# Patient Record
Sex: MALE | Race: WHITE | NOT HISPANIC OR LATINO | URBAN - METROPOLITAN AREA
[De-identification: names, ages, dates, MRNs, and addresses within clinical notes are randomized per-mention and may not be internally consistent; named-entity substitution may affect disease eponyms.]

---

## 2019-07-20 ENCOUNTER — INPATIENT (INPATIENT)
Facility: HOSPITAL | Age: 69
LOS: 2 days | Discharge: HOME | DRG: 923 | End: 2019-07-23
Attending: INTERNAL MEDICINE | Admitting: INTERNAL MEDICINE
Payer: MEDICARE

## 2019-07-20 VITALS
TEMPERATURE: 99 F | DIASTOLIC BLOOD PRESSURE: 89 MMHG | HEIGHT: 68 IN | RESPIRATION RATE: 16 BRPM | OXYGEN SATURATION: 98 % | WEIGHT: 139.99 LBS | SYSTOLIC BLOOD PRESSURE: 125 MMHG | HEART RATE: 116 BPM

## 2019-07-20 DIAGNOSIS — R42 DIZZINESS AND GIDDINESS: ICD-10-CM

## 2019-07-20 DIAGNOSIS — T67.9XXA EFFECT OF HEAT AND LIGHT, UNSPECIFIED, INITIAL ENCOUNTER: ICD-10-CM

## 2019-07-20 DIAGNOSIS — Z29.9 ENCOUNTER FOR PROPHYLACTIC MEASURES, UNSPECIFIED: ICD-10-CM

## 2019-07-20 LAB
ALBUMIN SERPL ELPH-MCNC: 3.5 G/DL — SIGNIFICANT CHANGE UP (ref 3.5–5)
ALP SERPL-CCNC: 78 U/L — SIGNIFICANT CHANGE UP (ref 40–120)
ALT FLD-CCNC: 17 U/L DA — SIGNIFICANT CHANGE UP (ref 10–60)
ANION GAP SERPL CALC-SCNC: 10 MMOL/L — SIGNIFICANT CHANGE UP (ref 5–17)
APPEARANCE UR: CLEAR — SIGNIFICANT CHANGE UP
APTT BLD: 27.6 SEC — SIGNIFICANT CHANGE UP (ref 27.5–36.3)
AST SERPL-CCNC: 15 U/L — SIGNIFICANT CHANGE UP (ref 10–40)
BASOPHILS # BLD AUTO: 0.02 K/UL — SIGNIFICANT CHANGE UP (ref 0–0.2)
BASOPHILS NFR BLD AUTO: 0.2 % — SIGNIFICANT CHANGE UP (ref 0–2)
BILIRUB SERPL-MCNC: 0.4 MG/DL — SIGNIFICANT CHANGE UP (ref 0.2–1.2)
BILIRUB UR-MCNC: NEGATIVE — SIGNIFICANT CHANGE UP
BUN SERPL-MCNC: 15 MG/DL — SIGNIFICANT CHANGE UP (ref 7–18)
CALCIUM SERPL-MCNC: 8.4 MG/DL — SIGNIFICANT CHANGE UP (ref 8.4–10.5)
CHLORIDE SERPL-SCNC: 105 MMOL/L — SIGNIFICANT CHANGE UP (ref 96–108)
CK SERPL-CCNC: 102 U/L — SIGNIFICANT CHANGE UP (ref 35–232)
CO2 SERPL-SCNC: 22 MMOL/L — SIGNIFICANT CHANGE UP (ref 22–31)
COLOR SPEC: YELLOW — SIGNIFICANT CHANGE UP
CREAT SERPL-MCNC: 1.05 MG/DL — SIGNIFICANT CHANGE UP (ref 0.5–1.3)
DIFF PNL FLD: NEGATIVE — SIGNIFICANT CHANGE UP
EOSINOPHIL # BLD AUTO: 0.01 K/UL — SIGNIFICANT CHANGE UP (ref 0–0.5)
EOSINOPHIL NFR BLD AUTO: 0.1 % — SIGNIFICANT CHANGE UP (ref 0–6)
GLUCOSE SERPL-MCNC: 109 MG/DL — HIGH (ref 70–99)
GLUCOSE UR QL: NEGATIVE — SIGNIFICANT CHANGE UP
HCT VFR BLD CALC: 42.4 % — SIGNIFICANT CHANGE UP (ref 39–50)
HGB BLD-MCNC: 14.3 G/DL — SIGNIFICANT CHANGE UP (ref 13–17)
IMM GRANULOCYTES NFR BLD AUTO: 0.2 % — SIGNIFICANT CHANGE UP (ref 0–1.5)
INR BLD: 1 RATIO — SIGNIFICANT CHANGE UP (ref 0.88–1.16)
KETONES UR-MCNC: NEGATIVE — SIGNIFICANT CHANGE UP
LEUKOCYTE ESTERASE UR-ACNC: NEGATIVE — SIGNIFICANT CHANGE UP
LYMPHOCYTES # BLD AUTO: 1.07 K/UL — SIGNIFICANT CHANGE UP (ref 1–3.3)
LYMPHOCYTES # BLD AUTO: 10.9 % — LOW (ref 13–44)
MCHC RBC-ENTMCNC: 31.6 PG — SIGNIFICANT CHANGE UP (ref 27–34)
MCHC RBC-ENTMCNC: 33.7 GM/DL — SIGNIFICANT CHANGE UP (ref 32–36)
MCV RBC AUTO: 93.8 FL — SIGNIFICANT CHANGE UP (ref 80–100)
MONOCYTES # BLD AUTO: 0.31 K/UL — SIGNIFICANT CHANGE UP (ref 0–0.9)
MONOCYTES NFR BLD AUTO: 3.2 % — SIGNIFICANT CHANGE UP (ref 2–14)
NEUTROPHILS # BLD AUTO: 8.35 K/UL — HIGH (ref 1.8–7.4)
NEUTROPHILS NFR BLD AUTO: 85.4 % — HIGH (ref 43–77)
NITRITE UR-MCNC: NEGATIVE — SIGNIFICANT CHANGE UP
NRBC # BLD: 0 /100 WBCS — SIGNIFICANT CHANGE UP (ref 0–0)
PH UR: 6 — SIGNIFICANT CHANGE UP (ref 5–8)
PLATELET # BLD AUTO: 245 K/UL — SIGNIFICANT CHANGE UP (ref 150–400)
POTASSIUM SERPL-MCNC: 3.8 MMOL/L — SIGNIFICANT CHANGE UP (ref 3.5–5.3)
POTASSIUM SERPL-SCNC: 3.8 MMOL/L — SIGNIFICANT CHANGE UP (ref 3.5–5.3)
PROT SERPL-MCNC: 8 G/DL — SIGNIFICANT CHANGE UP (ref 6–8.3)
PROT UR-MCNC: NEGATIVE — SIGNIFICANT CHANGE UP
PROTHROM AB SERPL-ACNC: 11.1 SEC — SIGNIFICANT CHANGE UP (ref 10–12.9)
RBC # BLD: 4.52 M/UL — SIGNIFICANT CHANGE UP (ref 4.2–5.8)
RBC # FLD: 13 % — SIGNIFICANT CHANGE UP (ref 10.3–14.5)
SODIUM SERPL-SCNC: 137 MMOL/L — SIGNIFICANT CHANGE UP (ref 135–145)
SP GR SPEC: 1.01 — SIGNIFICANT CHANGE UP (ref 1.01–1.02)
TROPONIN I SERPL-MCNC: <0.015 NG/ML — SIGNIFICANT CHANGE UP (ref 0–0.04)
UROBILINOGEN FLD QL: NEGATIVE — SIGNIFICANT CHANGE UP
WBC # BLD: 9.78 K/UL — SIGNIFICANT CHANGE UP (ref 3.8–10.5)
WBC # FLD AUTO: 9.78 K/UL — SIGNIFICANT CHANGE UP (ref 3.8–10.5)

## 2019-07-20 PROCEDURE — 99221 1ST HOSP IP/OBS SF/LOW 40: CPT

## 2019-07-20 PROCEDURE — 99285 EMERGENCY DEPT VISIT HI MDM: CPT

## 2019-07-20 PROCEDURE — 70450 CT HEAD/BRAIN W/O DYE: CPT | Mod: 26

## 2019-07-20 RX ORDER — ENOXAPARIN SODIUM 100 MG/ML
40 INJECTION SUBCUTANEOUS DAILY
Refills: 0 | Status: DISCONTINUED | OUTPATIENT
Start: 2019-07-20 | End: 2019-07-23

## 2019-07-20 RX ORDER — ASPIRIN/CALCIUM CARB/MAGNESIUM 324 MG
81 TABLET ORAL DAILY
Refills: 0 | Status: DISCONTINUED | OUTPATIENT
Start: 2019-07-20 | End: 2019-07-20

## 2019-07-20 RX ORDER — MECLIZINE HCL 12.5 MG
25 TABLET ORAL EVERY 8 HOURS
Refills: 0 | Status: DISCONTINUED | OUTPATIENT
Start: 2019-07-20 | End: 2019-07-23

## 2019-07-20 RX ORDER — ACETAMINOPHEN 500 MG
650 TABLET ORAL ONCE
Refills: 0 | Status: COMPLETED | OUTPATIENT
Start: 2019-07-20 | End: 2019-07-20

## 2019-07-20 RX ORDER — SODIUM CHLORIDE 9 MG/ML
1000 INJECTION INTRAMUSCULAR; INTRAVENOUS; SUBCUTANEOUS ONCE
Refills: 0 | Status: COMPLETED | OUTPATIENT
Start: 2019-07-20 | End: 2019-07-20

## 2019-07-20 RX ORDER — OXYCODONE AND ACETAMINOPHEN 5; 325 MG/1; MG/1
1 TABLET ORAL EVERY 6 HOURS
Refills: 0 | Status: DISCONTINUED | OUTPATIENT
Start: 2019-07-20 | End: 2019-07-21

## 2019-07-20 RX ORDER — ATORVASTATIN CALCIUM 80 MG/1
40 TABLET, FILM COATED ORAL AT BEDTIME
Refills: 0 | Status: DISCONTINUED | OUTPATIENT
Start: 2019-07-20 | End: 2019-07-20

## 2019-07-20 RX ORDER — ACETAMINOPHEN 500 MG
650 TABLET ORAL EVERY 6 HOURS
Refills: 0 | Status: DISCONTINUED | OUTPATIENT
Start: 2019-07-20 | End: 2019-07-23

## 2019-07-20 RX ORDER — SODIUM CHLORIDE 9 MG/ML
1000 INJECTION, SOLUTION INTRAVENOUS
Refills: 0 | Status: DISCONTINUED | OUTPATIENT
Start: 2019-07-20 | End: 2019-07-23

## 2019-07-20 RX ORDER — OXYCODONE AND ACETAMINOPHEN 5; 325 MG/1; MG/1
2 TABLET ORAL EVERY 6 HOURS
Refills: 0 | Status: DISCONTINUED | OUTPATIENT
Start: 2019-07-20 | End: 2019-07-21

## 2019-07-20 RX ADMIN — Medication 650 MILLIGRAM(S): at 16:41

## 2019-07-20 RX ADMIN — SODIUM CHLORIDE 1000 MILLILITER(S): 9 INJECTION INTRAMUSCULAR; INTRAVENOUS; SUBCUTANEOUS at 12:07

## 2019-07-20 RX ADMIN — OXYCODONE AND ACETAMINOPHEN 1 TABLET(S): 5; 325 TABLET ORAL at 23:54

## 2019-07-20 RX ADMIN — Medication 650 MILLIGRAM(S): at 19:52

## 2019-07-20 RX ADMIN — SODIUM CHLORIDE 70 MILLILITER(S): 9 INJECTION, SOLUTION INTRAVENOUS at 23:12

## 2019-07-20 NOTE — CHART NOTE - NSCHARTNOTEFT_GEN_A_CORE
Pt is a  69 year old male who came to the ED  for  dizziness and headache . Pt was seen at bedside by this swker. Pt states he lives with his brother in an elevator building and ambulates with a cane.   Pt reports that he does not have air-conditioner in his apartment and he has been having a headache since yesterday due to the heat. Pt is currently admitted. He was provided with an address of a cooling center that is open until 9pm Monday through Sunday, [facility] () . Pt was encouraged to utilize the resource.

## 2019-07-20 NOTE — ED ADULT NURSE REASSESSMENT NOTE - NS ED NURSE REASSESS COMMENT FT1
as per the pt family next to him pt sat him self on the floor states " I don'nt want to go home "
called  pt had concerns going back home pt states " its too hot at home "

## 2019-07-20 NOTE — ED PROVIDER NOTE - PROGRESS NOTE DETAILS
tej  pt states he has no airconditioner and lives alone   he cannot go home as is it over 100 degrees outside  ems apparently stated that the home was "very very hot"   pt unable to go to cooling station   will admit

## 2019-07-20 NOTE — H&P ADULT - NSHPPHYSICALEXAM_GEN_ALL_CORE
· CONSTITUTIONAL: Well appearing, well nourished, awake,confused   · ENMT: Airway patent, Nasal mucosa clear. Mouth with normal mucosa. Throat has no vesicles, no oropharyngeal exudates and uvula is midline.  · EYES: Clear bilaterally, pupils equal, round and reactive to light.  · CARDIAC: Normal rate, regular rhythm.  Heart sounds S1, S2.  No murmurs, rubs or gallops.  · RESPIRATORY: Breath sounds clear and equal bilaterally.  · GASTROINTESTINAL: Abdomen soft, non-tender, no guarding.  · MUSCULOSKELETAL: Spine appears normal, range of motion is not limited, no muscle or joint tenderness  · NEUROLOGICAL: Alert and alert follows commands only , no focal deficits, no motor or sensory deficits.  · SKIN: Skin normal color for race, warm, dry and intact. No evidence of rash.    Vital Signs Last 24 Hrs  T(C): 36.7 (20 Jul 2019 21:17), Max: 37.3 (20 Jul 2019 17:20)  T(F): 98.1 (20 Jul 2019 21:17), Max: 99.1 (20 Jul 2019 17:20)  HR: 94 (20 Jul 2019 21:17) (88 - 116)  BP: 143/73 (20 Jul 2019 21:17) (104/60 - 143/73)  RR: 17 (20 Jul 2019 21:17) (16 - 18)  SpO2: 99% (20 Jul 2019 21:17) (97% - 99%) Vital Signs Last 24 Hrs  T(C): 36.7 (20 Jul 2019 21:17), Max: 37.3 (20 Jul 2019 17:20)  T(F): 98.1 (20 Jul 2019 21:17), Max: 99.1 (20 Jul 2019 17:20)  HR: 94 (20 Jul 2019 21:17) (88 - 116)  BP: 143/73 (20 Jul 2019 21:17) (104/60 - 143/73)  RR: 17 (20 Jul 2019 21:17) (16 - 18)  SpO2: 99% (20 Jul 2019 21:17) (97% - 99%)      · CONSTITUTIONAL: Well appearing, well nourished, awake, confused   · ENMT: Airway patent, Nasal mucosa clear. Mouth with normal mucosa. Throat has no vesicles, no oropharyngeal exudates and uvula is midline.  · EYES: Clear bilaterally, pupils equal, round and reactive to light.  · CARDIAC: Normal rate, regular rhythm.  Heart sounds S1, S2.  No murmurs, rubs or gallops.  · RESPIRATORY: Breath sounds clear and equal bilaterally.  · GASTROINTESTINAL: Abdomen soft, non-tender, no guarding.  · MUSCULOSKELETAL: Spine appears normal, range of motion is not limited, no muscle or joint tenderness  · NEUROLOGICAL: Alert and alert follows commands only , no focal deficits, no motor or sensory deficits.  · SKIN: Skin normal color for race, warm, dry and intact. No evidence of rash.

## 2019-07-20 NOTE — ED PROVIDER NOTE - CARE PLAN
Principal Discharge DX:	Dizziness  Secondary Diagnosis:	Acute nonintractable headache, unspecified headache type  Secondary Diagnosis:	Heat exhaustion, initial encounter Principal Discharge DX:	Heat effects of, initial encounter  Secondary Diagnosis:	Acute nonintractable headache, unspecified headache type  Secondary Diagnosis:	Heat exhaustion, initial encounter

## 2019-07-20 NOTE — H&P ADULT - PROBLEM SELECTOR PLAN 2
- p/w headache , dizziness x 1 day   - upon my encounter : pt was confused  - Will give IV fluids and hydration  - f/u Orthostatics    - f/u Social work consult (pt does not have A/C at home)

## 2019-07-20 NOTE — H&P ADULT - ASSESSMENT
68 y/o M patient presents to the ED w/ dizziness and headache that began last night w/ suspicion of a heat-related illness as per patient. Patient notes he was short of breath before but now is not.  pt mentioned he does not have A/C at home with hot weather these days. Patient denies LOC or passing out. Patient denies vomiting, fever, diarrhea, or urinary symptoms. Patient denies smoking, EtOH use, or any other drug use. NKDA.  In ED :     EKG and labs are unremarkable.     Pt is admitted for dizziness 70 y/o M poor historian patient presents to the ED w/ dizziness and headache that began last night w/ suspicion of a heat-related illness as per patient. Patient notes he was short of breath before but now is not.  pt mentioned he does not have A/C at home with hot weather these days. Patient denies LOC or passing out. Patient denies vomiting, fever, diarrhea, or urinary symptoms. Patient denies smoking, EtOH use, or any other drug use. NKDA.  In ED :     EKG and labs are unremarkable.   Pt is admitted for dizziness 68 y/o M poor historian patient presents to the ED w/ dizziness and headache that began last night w/ suspicion of a heat-related illness as per patient. Patient notes he was short of breath before but now is not.  pt mentioned he does not have A/C at home with hot weather these days. Patient denies LOC or passing out. Patient denies vomiting, fever, diarrhea, or urinary symptoms. Patient denies smoking, EtOH use, or any other drug use. NKDA.  In ED:   EKG and labs are unremarkable.   Pt is admitted for dizziness

## 2019-07-20 NOTE — H&P ADULT - PROBLEM SELECTOR PLAN 3
IMPROVE VTE Individual Risk Assessment    RISK                                                                Points    [  ] Previous VTE                                                  3    [  ] Thrombophilia                                               2    [  ] Lower limb paralysis                                      2        (unable to hold up >15 seconds)    [  ] Current Cancer                                              2         (within 6 months)  [X] Immobilization > 24 hrs                                1  [  ] ICU/CCU stay > 24 hours                              1  [X] Age > 60                                                      1    IMPROVE VTE Score _____2____  c/w Lovenox 40 mg qd  no need for GI ppx.

## 2019-07-20 NOTE — CONSULT NOTE ADULT - SUBJECTIVE AND OBJECTIVE BOX
69 [facility] who experienced dizziness (not room-spinning sensation) and frontal headache while at home. Dizziness is resolved, headache is persisting. No loss of consciousness, photophobia, phonophobia, nausea, or emesis.  He has not had previous similar symptoms.    Neuro exam: Oriented x 2 (not to time), No focal neuro deficits in cranial nerves, motor, sensory, coordination, or reflexes. Gait/Romberg deferred per patient request due to headache. NIHSS 0, MRS 1.    Likely diagnosis: Tension headache and dizziness in setting of heat (unlikely stroke or seizure)    Recs:  - Encourage plentiful fluid hydration (2L water daily)  - Provide conservative pain management for headache (acetaminophen or NSAIDs); avoid opiates to prevent worsening of disorientation  - Meclizine PRN dizziness/vertigo  - No need for aspirin or atorvastatin given no evidence of stroke on neurological exam and CT Head, unless these are indicated for non-neurological reasons.  - May consider carotid Doppler to evaluate blood flow to head.  - If carotid Doppler shows stenosis, may consider CTA Head for further evaluation of intracranial vasculature  - Monitor vital signs; if irregular, consider cardiovascular workup (Echo, Telemetry)        NOTE TO BE COMPLETED - PLEASE REFER TO ABOVE ONLY AND IGNORE INFORMATION BELOW    Neurology Consult    Patient is a 69y old  Male who presents with a chief complaint of Dizziness (2019 17:49)      HPI:  68 y/o M poor historian patient presents to the ED w/ dizziness and headache that began last night w/ suspicion of a heat-related illness as per patient. Patient notes he was short of breath before but now is not.  Patient mentioned he does not have A/C at home with hot weather these days. Patient denies LOC or passing out. Patient denies vomiting, fever, diarrhea, or urinary symptoms. Patient denies smoking, EtOH use, or any other drug use. NKDA.  In ED:   EKG and labs are unremarkable. (2019 17:49)      PAST MEDICAL & SURGICAL HISTORY:  No pertinent past medical history  No significant past surgical history      FAMILY HISTORY:  No pertinent family history in first degree relatives      Social History: (-) x 3    Allergies    No Known Allergies    Intolerances        MEDICATIONS  (STANDING):  aspirin enteric coated 81 milliGRAM(s) Oral daily  atorvastatin 40 milliGRAM(s) Oral at bedtime  dextrose 5% + sodium chloride 0.9%. 1000 milliLiter(s) (70 mL/Hr) IV Continuous <Continuous>  enoxaparin Injectable 40 milliGRAM(s) SubCutaneous daily    MEDICATIONS  (PRN):  meclizine 25 milliGRAM(s) Oral every 8 hours PRN Dizziness      Review of systems:    Constitutional: No fever, weight loss or fatigue    Eyes: No eye pain or discharge  ENMT:  No difficulty hearing; No sinus or throat pain  Neck: No pain or stiffness  Respiratory: No cough, wheezing, chills or hemoptysis  Cardiovascular: No chest pain, palpitations, shortness of breath, dyspnea on exertion  Gastrointestinal: No abdominal pain, nausea, vomiting or hematemesis; No diarrhea or constipation.   Genitourinary: No dysuria, frequency, hematuria or incontinence  Neurological: As per HPI  Skin: No rashes or lesions   Endocrine: No heat or cold intolerance; No hair loss  Musculoskeletal: No joint pain or swelling  Psychiatric: No depression, anxiety, mood swings  Heme/Lymph: No easy bruising or bleeding gums    Vital Signs Last 24 Hrs  T(C): 36.7 (2019 21:17), Max: 37.3 (2019 17:20)  T(F): 98.1 (2019 21:17), Max: 99.1 (2019 17:20)  HR: 94 (2019 21:17) (88 - 116)  BP: 143/73 (2019 21:17) (104/60 - 143/73)  BP(mean): --  RR: 17 (2019 21:17) (16 - 18)  SpO2: 99% (2019 21:17) (97% - 99%)    Neurologic Examination:  General:  Appearance is consistent with chronologic age.  No abnormal facies.   General: The patient is oriented to person, place, time and date.  Recent and remote memory intact.  Fund of knowledge is intact and normal.  Language with normal repetition, comprehension and naming.  Nondysarthric.    Cranial nerves: intact VA, VFF.  EOMI w/o nystagmus, skew or reported double vision.  PERRL.  No ptosis/weakness of eyelid closure.  Facial sensation is normal with normal bite.  No facial asymmetry.  Hearing grossly intact b/l.  Palate elevates midline.  Tongue midline.  Motor examination:   Normal tone, bulk and range of motion.  No tenderness, twitching, tremors or involuntary movements.  Formal Muscle Strength Testing: (MRC grade R/L) 5/5 UE; 5/5 LE.  No observable drift.  Reflexes:   2+ b/l pectoralis, biceps, triceps, brachioradialis, patella and Achilles.  Plantar response downgoing b/l.  Jaw jerk, Elissa, clonus absent.  Sensory examination:   Intact to light touch and pinprick, pain, temperature and proprioception and vibration in all extremities.  Cerebellum:   FTN/HKS intact with normal ZION in all limbs.  No dysmetria or dysdiadokinesia.  Gait narrow based and normal.    Labs:   CBC Full  -  ( 2019 12:41 )  WBC Count : 9.78 K/uL  RBC Count : 4.52 M/uL  Hemoglobin : 14.3 g/dL  Hematocrit : 42.4 %  Platelet Count - Automated : 245 K/uL  Mean Cell Volume : 93.8 fl  Mean Cell Hemoglobin : 31.6 pg  Mean Cell Hemoglobin Concentration : 33.7 gm/dL  Auto Neutrophil # : 8.35 K/uL  Auto Lymphocyte # : 1.07 K/uL  Auto Monocyte # : 0.31 K/uL  Auto Eosinophil # : 0.01 K/uL  Auto Basophil # : 0.02 K/uL  Auto Neutrophil % : 85.4 %  Auto Lymphocyte % : 10.9 %  Auto Monocyte % : 3.2 %  Auto Eosinophil % : 0.1 %  Auto Basophil % : 0.2 %    07-20    137  |  105  |  15  ----------------------------<  109<H>  3.8   |  22  |  1.05    Ca    8.4      2019 12:41    TPro  8.0  /  Alb  3.5  /  TBili  0.4  /  DBili  x   /  AST  15  /  ALT  17  /  AlkPhos  78  07-20    LIVER FUNCTIONS - ( 2019 12:41 )  Alb: 3.5 g/dL / Pro: 8.0 g/dL / ALK PHOS: 78 U/L / ALT: 17 U/L DA / AST: 15 U/L / GGT: x           PT/INR - ( 2019 14:54 )   PT: 11.1 sec;   INR: 1.00 ratio         PTT - ( 2019 14:54 )  PTT:27.6 sec  Urinalysis Basic - ( 2019 14:54 )    Color: Yellow / Appearance: Clear / S.010 / pH: x  Gluc: x / Ketone: Negative  / Bili: Negative / Urobili: Negative   Blood: x / Protein: Negative / Nitrite: Negative   Leuk Esterase: Negative / RBC: x / WBC x   Sq Epi: x / Non Sq Epi: x / Bacteria: x          Neuroimaging:    CT Head (19):  - No acute intracranial abnormality  - Mild diffuse atrophy and mild ventriculomegaly    EXAM:    CT Head Without Contrast     EXAM DATE/TIME:    2019 8:27 PM     CLINICAL HISTORY:    69 years old, male; Altered mental status/memory loss     TECHNIQUE:    Imaging protocol: Computed tomography images of the head without   contrast.   Coronal and sagittal reformatted images were created and reviewed.     COMPARISON:    No relevant prior studies available.     FINDINGS:    Brain: Normal. No hemorrhage. Unremarkable white matter. No mass effect.    Ventricles: There is mild, diffuse involutional change with mild   associated   ventriculomegaly.    Bones/joints: Unremarkable. No acute fracture.    Sinuses: Visualized sinuses are unremarkable. No fluid levels.    Mastoid air cells: Visualized mastoid air cells are well aerated. No   mastoid   effusion.    Soft tissues: Unremarkable.     IMPRESSION:   There is mild, diffuse involutional change with mild associated   ventriculomegaly. No acute or focal brain abnormality is seen.          ******PRELIMINARY REPORT******    ******PRELIMINARY REPORT******              CL LOPEZ M.D.;[facility] RADIOLOGIST               (19 @ 20:41)    CT Angiography/Perfusion:  MRI Brain NC:  MRA Head/Neck:  EEG:    Assessment:  This is a 69y Male with h/o     Plan:   -   19 @ 23:08          Please contact the Neurology consult service with any questions.    Jerardo Tyler MD  Neurology Attending  [facility] Neurology Consult    Patient is a 69y old  Male who presents with a chief complaint of Dizziness (2019 17:49)    HPI:  68 y/o M poor historian patient presents to the ED w/ dizziness and headache that began last night w/ suspicion of a heat-related illness as per patient. Patient notes he was short of breath before but now is not.  Patient mentioned he does not have A/C at home with hot weather these days. Patient denies LOC or passing out. Patient denies vomiting, fever, diarrhea, or urinary symptoms. Patient denies smoking, EtOH use, or any other drug use. NKDA.  In ED: EKG and labs are unremarkable. (2019 17:49)    Neuro HPI:  69 LHM who experienced dizziness (not room-spinning sensation) and frontal headache while at home. Dizziness is resolved, headache is persisting. No loss of consciousness, photophobia, phonophobia, nausea, or emesis. He has not had previous similar symptoms.    PAST MEDICAL & SURGICAL HISTORY: None reported    FAMILY HISTORY: None reported    Social History: No toxic habits reported    Allergies  No Known Allergies    MEDICATIONS  (STANDING):  aspirin enteric coated 81 milliGRAM(s) Oral daily  atorvastatin 40 milliGRAM(s) Oral at bedtime  dextrose 5% + sodium chloride 0.9%. 1000 milliLiter(s) (70 mL/Hr) IV Continuous <Continuous>  enoxaparin Injectable 40 milliGRAM(s) SubCutaneous daily    MEDICATIONS  (PRN):  meclizine 25 milliGRAM(s) Oral every 8 hours PRN Dizziness    Review of systems:    Constitutional: No fever, weight loss or fatigue    Eyes: No eye pain or discharge  ENMT:  No difficulty hearing; No sinus or throat pain  Neck: No pain or stiffness  Respiratory: No cough, wheezing, chills or hemoptysis  Cardiovascular: No chest pain, palpitations, shortness of breath  Gastrointestinal: No abdominal pain, nausea, vomiting or hematemesis; No diarrhea or constipation.   Genitourinary: No dysuria, frequency, hematuria or incontinence  Neurological: As per HPI  Skin: No rashes or lesions   Endocrine: No heat or cold intolerance  Musculoskeletal: No joint pain or swelling  Psychiatric: No depression, anxiety  Heme/Lymph: No easy bruising or bleeding      Objective:    Vital Signs Last 24 Hrs  T(C): 36.7 (2019 21:17), Max: 37.3 (2019 17:20)  T(F): 98.1 (2019 21:17), Max: 99.1 (2019 17:20)  HR: 94 (2019 21:17) (88 - 116)  BP: 143/73 (2019 21:17) (104/60 - 143/73)  RR: 17 (2019 21:17) (16 - 18)  SpO2: 99% (2019 21:17) (97% - 99%)    General Exam:  General: No acute distress  Respiratory: CTAB/l.  No crackles, rhonchi, or wheezes.  Cardiovascular: RRR, No murmurs, Full b/l radial and pedal pulses    Neurological Exam:  General / Mental Status: Oriented to person and place, but not to time.  No dysarthria or aphasia present.  Naming and repetition intact.  Cranial Nerves: PERRLA, EOMI x 2, VFF x 4, No nystagmus or diplopia, Optic discs normal b/l.  B/l V1-V3 equal to light touch and pinprick.  Symmetric facial movement and palate elevation.  B/l hearing equal to finger rub.  5/5 strength with b/l sternocleidomastoid and trapezius.  Midline tongue protrusion with no atrophy or fasciculations.  Motor: Normal bulk and tone in all four extremities.  5/5 strength throughout all four extremities.  No downward drift, rigidity, spasticity, or tremors in any of the four extremities.  Sensation: Intact to light touch and pinprick in all four extremities.  Coordination: No dysmetria with b/l finger-to-nose and heel raise tests.  Normal rapid alternating movements b/l.  Reflexes: 2+ and symmetric at b/l biceps, triceps, brachioradialis, patellae, and ankles.  Toes flexor b/l.  Gait and Romberg testing deferred per patient request due to headache.     NIHSS Score:    LOC - 0  LOC Questions - 0  LOC Commands - 0  Gaze Preference - 0  Visual Fields - 0  Facial Palsy - 0  Motor Arm Left - 0  Motor Arm Right - 0  Motor Leg Left - 0  Motor Leg Right - 0  Limb Ataxia - 0  Sensory - 0  Language - 0  Speech - 0  Extinction - 0    NIHSS Score Total: 0    Modified Richard Scale: 1      Labs:   CBC Full  -  ( 2019 12:41 )  WBC Count : 9.78 K/uL  RBC Count : 4.52 M/uL  Hemoglobin : 14.3 g/dL  Hematocrit : 42.4 %  Platelet Count - Automated : 245 K/uL  Mean Cell Volume : 93.8 fl  Mean Cell Hemoglobin : 31.6 pg  Mean Cell Hemoglobin Concentration : 33.7 gm/dL  Auto Neutrophil # : 8.35 K/uL  Auto Lymphocyte # : 1.07 K/uL  Auto Monocyte # : 0.31 K/uL  Auto Eosinophil # : 0.01 K/uL  Auto Basophil # : 0.02 K/uL  Auto Neutrophil % : 85.4 %  Auto Lymphocyte % : 10.9 %  Auto Monocyte % : 3.2 %  Auto Eosinophil % : 0.1 %  Auto Basophil % : 0.2 %        137  |  105  |  15  ----------------------------<  109<H>  3.8   |  22  |  1.05    Ca    8.4      2019 12:41    TPro  8.0  /  Alb  3.5  /  TBili  0.4  /  DBili  x   /  AST  15  /  ALT  17  /  AlkPhos  78  07-20    PT/INR - ( 2019 14:54 )   PT: 11.1 sec;   INR: 1.00 ratio    PTT - ( 2019 14:54 )  PTT:27.6 sec    Urinalysis Basic - ( 2019 14:54 )  Color: Yellow / Appearance: Clear / S.010 / pH: x  Gluc: x / Ketone: Negative  / Bili: Negative / Urobili: Negative   Blood: x / Protein: Negative / Nitrite: Negative   Leuk Esterase: Negative / RBC: x / WBC x   Sq Epi: x / Non Sq Epi: x / Bacteria: x      Neuroimaging:    CT Head (19):  - No acute intracranial abnormality  - Mild diffuse atrophy and mild ventriculomegaly      Assessment:  69 LHM with likely tension headache and dizziness in setting of heat, unlikely to be stroke given normal neurological exam, and unlikely to be seizure given no loss of consciousness      Recommendations:    - Encourage plentiful fluid hydration (2L water daily).    - Provide conservative pain management for headache (acetaminophen or NSAIDs); avoid opiates to prevent worsening of disorientation.    - Meclizine PRN dizziness/vertigo.    - No need for aspirin or atorvastatin given no evidence of stroke on neurological exam and CT Head, unless these are indicated for non-neurological reasons.    - May consider carotid Doppler to evaluate blood flow to head.    - If carotid Doppler shows stenosis, may consider CTA Head for further evaluation of intracranial vasculature.    - Monitor vital signs; if irregular, consider cardiovascular workup (Echo, Telemetry).      Please contact the Neurology consult service with any questions.    Jerardo Tyler MD  Neurology Attending  [facility]

## 2019-07-20 NOTE — ED PROVIDER NOTE - OBJECTIVE STATEMENT
68 y/o M patient presents to the ED w/ dizziness and headache that began last night. Patient notes he was short of breath before but now is not. Patient denies LOC or passing out. Patient denies vomiting, fever, diarrhea, or urinary symptoms. Patient denies smoking, EtOH use, or any other drug use. NKDA.

## 2019-07-20 NOTE — H&P ADULT - PROBLEM SELECTOR PLAN 1
- p/w headache , dizziness x 1 day   - no nystagmus  - Tashi ames pike test could not be assessed  - EKG: NSR   - upon my encounter : pt was confused  - Will give IV fluids and hydration  - NPO except Meds  - advance diet as tolerated   - started on aspirin and statin   - c/w meclizine 25 q 8 for now PRn  - f/u CT head to r/o posterior stroke circulation   - f/u Orthostatics    - f/u carotid doppler   - f/u Echo  - f/u hba1c and lipid panel   - f/u PT Eval  - f/u Social work consult (pt does not have A/C at home) - p/w headache , dizziness x 1 day   - no nystagmus  - Tashi ames pike test could not be assessed  - EKG: NSR   - upon my encounter : pt was confused  - Will give IV fluids and hydration  - NPO except Meds  - advance diet as tolerated  - Fall precautions   - started on aspirin and statin   - c/w meclizine 25 q 8 for now PRn  - f/u CT head to r/o posterior stroke circulation   - f/u Orthostatics    - f/u carotid doppler   - f/u Echo  - f/u hba1c and lipid panel   - f/u PT Eval  - f/u Social work consult (pt does not have A/C at home) - p/w headache , dizziness x 1 day   - no nystagmus  - Tashi ames pike test could not be assessed  - EKG: NSR   - upon my encounter : pt was confused  - Will give IV fluids and hydration  - NPO except Meds  - advance diet as tolerated  - Fall precautions   - started on aspirin and statin   - c/w meclizine 25 q 8 for now PRn  - f/u CT head to r/o posterior stroke circulation   - f/u Orthostatics    - f/u carotid doppler   - f/u Echo  - f/u hba1c and lipid panel   - f/u PT Eval  - f/u Social work consult (pt does not have A/C at home)  ** Neurology consulted Dr. Tyler - p/w headache , dizziness x 1 day   - no nystagmus  - Tashi ames pike test could not be assessed  - EKG: NSR   - upon my encounter : pt was confused  - Will give IV fluids and hydration  - NPO except Meds  - Fall precautions   - C/ Pain Meds Tylenol /Oxycodone PRN  - c/w meclizine 25 q 8 for now PRn  - f/u CT head to r/o posterior stroke circulation   - f/u Orthostatics    - f/u carotid doppler   - f/u Echo  - f/u hba1c and lipid panel   - f/u PT Eval  - S/S  - f/u Social work consult (pt does not have A/C at home)  ** Neurology consulted Dr. Tyler

## 2019-07-20 NOTE — H&P ADULT - ATTENDING COMMENTS
Patient seen and examined in ED. Patient's history, vitals, labs, imaging studies reviewed. Discussed with above resident, agree with note with edits, and educated on the diagnosis and management of above medical conditions. Plan of care discussed with patient, and agrees, all questions answered.   Emily Suarez MD Patient seen and examined in ED. Patient's history, vitals, labs, imaging studies reviewed. Discussed with above resident, agree with note with edits, and educated on the diagnosis and management of above medical conditions. Plan of care discussed with patient, and agrees, all questions answered.   Emily Suarez MD.

## 2019-07-20 NOTE — ED PROVIDER NOTE - CLINICAL SUMMARY MEDICAL DECISION MAKING FREE TEXT BOX
70 y/o M patient presents to the ED w/ suspicion of a heat-related illness as per patient. EKG and labs are unremarkable. Will give IV fluids and hydration. Patient is safe for discharge with return precautions.

## 2019-07-21 DIAGNOSIS — E53.8 DEFICIENCY OF OTHER SPECIFIED B GROUP VITAMINS: ICD-10-CM

## 2019-07-21 LAB
ALBUMIN SERPL ELPH-MCNC: 3 G/DL — LOW (ref 3.5–5)
ALP SERPL-CCNC: 66 U/L — SIGNIFICANT CHANGE UP (ref 40–120)
ALT FLD-CCNC: 14 U/L DA — SIGNIFICANT CHANGE UP (ref 10–60)
ANION GAP SERPL CALC-SCNC: 6 MMOL/L — SIGNIFICANT CHANGE UP (ref 5–17)
AST SERPL-CCNC: 19 U/L — SIGNIFICANT CHANGE UP (ref 10–40)
BASOPHILS # BLD AUTO: 0.01 K/UL — SIGNIFICANT CHANGE UP (ref 0–0.2)
BASOPHILS NFR BLD AUTO: 0.1 % — SIGNIFICANT CHANGE UP (ref 0–2)
BILIRUB SERPL-MCNC: 0.4 MG/DL — SIGNIFICANT CHANGE UP (ref 0.2–1.2)
BUN SERPL-MCNC: 8 MG/DL — SIGNIFICANT CHANGE UP (ref 7–18)
CALCIUM SERPL-MCNC: 8.1 MG/DL — LOW (ref 8.4–10.5)
CHLORIDE SERPL-SCNC: 111 MMOL/L — HIGH (ref 96–108)
CHOLEST SERPL-MCNC: 209 MG/DL — HIGH (ref 10–199)
CO2 SERPL-SCNC: 24 MMOL/L — SIGNIFICANT CHANGE UP (ref 22–31)
CREAT SERPL-MCNC: 0.97 MG/DL — SIGNIFICANT CHANGE UP (ref 0.5–1.3)
EOSINOPHIL # BLD AUTO: 0.11 K/UL — SIGNIFICANT CHANGE UP (ref 0–0.5)
EOSINOPHIL NFR BLD AUTO: 1 % — SIGNIFICANT CHANGE UP (ref 0–6)
FOLATE SERPL-MCNC: >20 NG/ML — SIGNIFICANT CHANGE UP
GLUCOSE BLDC GLUCOMTR-MCNC: 105 MG/DL — HIGH (ref 70–99)
GLUCOSE BLDC GLUCOMTR-MCNC: 105 MG/DL — HIGH (ref 70–99)
GLUCOSE BLDC GLUCOMTR-MCNC: 147 MG/DL — HIGH (ref 70–99)
GLUCOSE SERPL-MCNC: 106 MG/DL — HIGH (ref 70–99)
HBA1C BLD-MCNC: 5.6 % — SIGNIFICANT CHANGE UP (ref 4–5.6)
HCT VFR BLD CALC: 40.2 % — SIGNIFICANT CHANGE UP (ref 39–50)
HCV AB S/CO SERPL IA: 0.05 S/CO — SIGNIFICANT CHANGE UP (ref 0–0.99)
HCV AB SERPL-IMP: SIGNIFICANT CHANGE UP
HDLC SERPL-MCNC: 51 MG/DL — SIGNIFICANT CHANGE UP
HGB BLD-MCNC: 13.3 G/DL — SIGNIFICANT CHANGE UP (ref 13–17)
IMM GRANULOCYTES NFR BLD AUTO: 0.4 % — SIGNIFICANT CHANGE UP (ref 0–1.5)
LIPID PNL WITH DIRECT LDL SERPL: 133 MG/DL — SIGNIFICANT CHANGE UP
LYMPHOCYTES # BLD AUTO: 19.5 % — SIGNIFICANT CHANGE UP (ref 13–44)
LYMPHOCYTES # BLD AUTO: 2.05 K/UL — SIGNIFICANT CHANGE UP (ref 1–3.3)
MAGNESIUM SERPL-MCNC: 2.2 MG/DL — SIGNIFICANT CHANGE UP (ref 1.6–2.6)
MCHC RBC-ENTMCNC: 31.4 PG — SIGNIFICANT CHANGE UP (ref 27–34)
MCHC RBC-ENTMCNC: 33.1 GM/DL — SIGNIFICANT CHANGE UP (ref 32–36)
MCV RBC AUTO: 94.8 FL — SIGNIFICANT CHANGE UP (ref 80–100)
MONOCYTES # BLD AUTO: 0.58 K/UL — SIGNIFICANT CHANGE UP (ref 0–0.9)
MONOCYTES NFR BLD AUTO: 5.5 % — SIGNIFICANT CHANGE UP (ref 2–14)
NEUTROPHILS # BLD AUTO: 7.7 K/UL — HIGH (ref 1.8–7.4)
NEUTROPHILS NFR BLD AUTO: 73.5 % — SIGNIFICANT CHANGE UP (ref 43–77)
NRBC # BLD: 0 /100 WBCS — SIGNIFICANT CHANGE UP (ref 0–0)
PHOSPHATE SERPL-MCNC: 2.8 MG/DL — SIGNIFICANT CHANGE UP (ref 2.5–4.5)
PLATELET # BLD AUTO: 237 K/UL — SIGNIFICANT CHANGE UP (ref 150–400)
POTASSIUM SERPL-MCNC: 3.5 MMOL/L — SIGNIFICANT CHANGE UP (ref 3.5–5.3)
POTASSIUM SERPL-SCNC: 3.5 MMOL/L — SIGNIFICANT CHANGE UP (ref 3.5–5.3)
PROT SERPL-MCNC: 6.8 G/DL — SIGNIFICANT CHANGE UP (ref 6–8.3)
RBC # BLD: 4.24 M/UL — SIGNIFICANT CHANGE UP (ref 4.2–5.8)
RBC # FLD: 13.2 % — SIGNIFICANT CHANGE UP (ref 10.3–14.5)
SODIUM SERPL-SCNC: 141 MMOL/L — SIGNIFICANT CHANGE UP (ref 135–145)
TOTAL CHOLESTEROL/HDL RATIO MEASUREMENT: 4.1 RATIO — SIGNIFICANT CHANGE UP (ref 3.4–9.6)
TRIGL SERPL-MCNC: 127 MG/DL — SIGNIFICANT CHANGE UP (ref 10–149)
TSH SERPL-MCNC: 1.61 UU/ML — SIGNIFICANT CHANGE UP (ref 0.34–4.82)
VIT B12 SERPL-MCNC: 405 PG/ML — SIGNIFICANT CHANGE UP (ref 232–1245)
WBC # BLD: 10.49 K/UL — SIGNIFICANT CHANGE UP (ref 3.8–10.5)
WBC # FLD AUTO: 10.49 K/UL — SIGNIFICANT CHANGE UP (ref 3.8–10.5)

## 2019-07-21 PROCEDURE — 99231 SBSQ HOSP IP/OBS SF/LOW 25: CPT

## 2019-07-21 RX ORDER — ONDANSETRON 8 MG/1
4 TABLET, FILM COATED ORAL EVERY 6 HOURS
Refills: 0 | Status: DISCONTINUED | OUTPATIENT
Start: 2019-07-21 | End: 2019-07-23

## 2019-07-21 RX ORDER — PREGABALIN 225 MG/1
1000 CAPSULE ORAL DAILY
Refills: 0 | Status: DISCONTINUED | OUTPATIENT
Start: 2019-07-21 | End: 2019-07-23

## 2019-07-21 RX ADMIN — SODIUM CHLORIDE 70 MILLILITER(S): 9 INJECTION, SOLUTION INTRAVENOUS at 17:30

## 2019-07-21 RX ADMIN — PREGABALIN 1000 MICROGRAM(S): 225 CAPSULE ORAL at 21:14

## 2019-07-21 RX ADMIN — OXYCODONE AND ACETAMINOPHEN 1 TABLET(S): 5; 325 TABLET ORAL at 01:10

## 2019-07-21 RX ADMIN — ONDANSETRON 4 MILLIGRAM(S): 8 TABLET, FILM COATED ORAL at 00:25

## 2019-07-21 NOTE — PROGRESS NOTE ADULT - PROBLEM SELECTOR PLAN 2
- p/w headache , dizziness x 1 day   - give IV fluids and hydration  - f/u Orthostatics    - f/u Social work consult (pt does not have AC at home)

## 2019-07-21 NOTE — PROGRESS NOTE ADULT - SUBJECTIVE AND OBJECTIVE BOX
Patient is a 69y old  Male who presents with a chief complaint of Dizziness (2019 23:07)        MEDICATIONS  (STANDING):  dextrose 5% + sodium chloride 0.9%. 1000 milliLiter(s) (70 mL/Hr) IV Continuous <Continuous>  enoxaparin Injectable 40 milliGRAM(s) SubCutaneous daily    MEDICATIONS  (PRN):  acetaminophen   Tablet .. 650 milliGRAM(s) Oral every 6 hours PRN Mild Pain (1 - 3)  meclizine 25 milliGRAM(s) Oral every 8 hours PRN Dizziness  ondansetron Injectable 4 milliGRAM(s) IV Push every 6 hours PRN Nausea and/or Vomiting      REVIEW OF SYSTEMS:  CONSTITUTIONAL: No fever, weight loss, or fatigue  EYES: No eye pain, visual disturbances, or discharge  ENMT:  No difficulty hearing, tinnitus, vertigo; No sinus or throat pain  NECK: No pain or stiffness  RESPIRATORY: No cough, wheezing, chills or hemoptysis; No shortness of breath  CARDIOVASCULAR: No chest pain, palpitations, dizziness, or leg swelling  GASTROINTESTINAL: No abdominal or epigastric pain. No nausea, vomiting, or hematemesis; No diarrhea or constipation. No melena or hematochezia.  GENITOURINARY: No dysuria, frequency, hematuria, or incontinence  NEUROLOGICAL: No headaches, memory loss, loss of strength, numbness, or tremors  SKIN: No itching, burning, rashes, or lesions   LYMPH NODES: No enlarged glands  ENDOCRINE: No heat or cold intolerance; No hair loss  MUSCULOSKELETAL: No joint pain or swelling; No muscle, back, or extremity pain  PSYCHIATRIC: No depression, anxiety, mood swings, or difficulty sleeping  HEME/LYMPH: No easy bruising, or bleeding gums  ALLERY AND IMMUNOLOGIC: No hives or eczema    PHYSICAL EXAM:    T(C): 36.6 (19 @ 08:16), Max: 37.3 (19 @ 17:20)  HR: 87 (19 @ 08:16) (87 - 98)  BP: 130/80 (19 @ 08:16) (104/60 - 143/73)  RR: 17 (19 @ 08:16) (17 - 17)  SpO2: 99% (19 @ 08:16) (98% - 99%)    GENERAL: NAD, well-groomed, well-developed  HEAD:  Atraumatic, Normocephalic  EYES: EOMI, PERRL, conjunctiva and sclera clear  ENMT: No tonsillar erythema, exudates, or enlargement; Moist mucous membranes, Good dentition, No lesions  NECK: Supple, No JVD, Normal thyroid  NERVOUS SYSTEM:  Alert & Oriented X3, Good concentration; Motor Strength 5/5 B/L upper and lower extremities; DTRs 2+ intact and symmetric  CHEST/LUNG: Clear to ascultation bilaterally; No rales, rhonchi, wheezing, or rubs  HEART: Regular rate and rhythm; No murmurs, rubs, or gallops  ABDOMEN: Soft, Nontender, Nondistended; Bowel sounds present  EXTREMITIES:  2+ Peripheral Pulses, No clubbing, cyanosis, or edema  LYMPH: No lymphadenopathy noted  SKIN: No rashes or lesions    LABS:                        13.3   10.49 )-----------( 237      ( 2019 07:08 )             40.2     -    141  |  111<H>  |  8   ----------------------------<  106<H>  3.5   |  24  |  0.97    Ca    8.1<L>      2019 07:08  Phos  2.8       Mg     2.2         TPro  6.8  /  Alb  3.0<L>  /  TBili  0.4  /  DBili  x   /  AST  19  /  ALT  14  /  AlkPhos  66  -    PT/INR - ( 2019 14:54 )   PT: 11.1 sec;   INR: 1.00 ratio         PTT - ( 2019 14:54 )  PTT:27.6 sec  Urinalysis Basic - ( 2019 14:54 )    Color: Yellow / Appearance: Clear / S.010 / pH: x  Gluc: x / Ketone: Negative  / Bili: Negative / Urobili: Negative   Blood: x / Protein: Negative / Nitrite: Negative   Leuk Esterase: Negative / RBC: x / WBC x   Sq Epi: x / Non Sq Epi: x / Bacteria: x      CAPILLARY BLOOD GLUCOSE  POCT Blood Glucose.: 105 mg/dL (2019 08:45)  POCT Blood Glucose.: 105 mg/dL (2019 05:59)  POCT Blood Glucose.: 147 mg/dL (2019 00:30)        RADIOLOGY & ADDITIONAL TESTS:    Imaging Personally Reviewed:  [x] YES  [ ] NO    Consultant(s) Notes Reviewed:  [x] YES  [ ] NO    Care Discussed with Consultants/Other Providers [x] YES  [ ] NO Patient is a 69 year old male who presents with a chief complaint of Dizziness (2019 23:07)    Patient reports he feels better today    MEDICATIONS  (STANDING):  dextrose 5% + sodium chloride 0.9%. 1000 milliLiter(s) (70 mL/Hr) IV Continuous <Continuous>  enoxaparin Injectable 40 milliGRAM(s) SubCutaneous daily    MEDICATIONS  (PRN):  acetaminophen   Tablet .. 650 milliGRAM(s) Oral every 6 hours PRN Mild Pain (1 - 3)  meclizine 25 milliGRAM(s) Oral every 8 hours PRN Dizziness  ondansetron Injectable 4 milliGRAM(s) IV Push every 6 hours PRN Nausea and/or Vomiting      REVIEW OF SYSTEMS:  CONSTITUTIONAL: No fever, weight loss, has fatigue  EYES: No eye pain, visual disturbances, or discharge  ENMT:  No difficulty hearing, tinnitus, vertigo; No sinus or throat pain  NECK: No pain or stiffness  RESPIRATORY: No cough, wheezing, chills or hemoptysis; No shortness of breath  CARDIOVASCULAR: No chest pain, palpitations, dizziness, or leg swelling  GASTROINTESTINAL: No abdominal or epigastric pain. No nausea, vomiting, or hematemesis; No diarrhea or constipation. No melena or hematochezia.  GENITOURINARY: No dysuria, frequency, hematuria, or incontinence  NEUROLOGICAL: Dizziness improving, No headaches, memory loss, loss of strength, numbness, or tremors  SKIN: No itching, burning, rashes, or lesions   ENDOCRINE: No heat or cold intolerance; No hair loss  MUSCULOSKELETAL: No joint pain or swelling; No muscle, back, or extremity pain  PSYCHIATRIC: No depression, anxiety, mood swings, or difficulty sleeping  HEME/LYMPH: No easy bruising, or bleeding gums  ALLERGY AND IMMUNOLOGIC: No hives or eczema    PHYSICAL EXAM:    T(C): 36.6 (19 @ 08:16), Max: 37.3 (19 @ 17:20)  HR: 87 (19 @ 08:16) (87 - 98)  BP: 130/80 (19 @ 08:16) (104/60 - 143/73)  RR: 17 (19 @ 08:16) (17 - 17)  SpO2: 99% (19 @ 08:16) (98% - 99%)    GENERAL: NAD, well-groomed, well-developed  HEAD:  Atraumatic, Normocephalic  EYES: EOMI, PERRL, conjunctiva and sclera clear  ENMT: No tonsillar erythema, exudates, or enlargement; Moist mucous membranes, Good dentition, No lesions  NECK: Supple, No JVD, Normal thyroid  NERVOUS SYSTEM:  Alert & Oriented X3, Good concentration; Motor Strength 5/5 B/L upper and lower extremities; DTRs 2+ intact and symmetric  CHEST/LUNG: Clear to ascultation bilaterally; No rales, rhonchi, wheezing, or rubs  HEART: Regular rate and rhythm; No murmurs, rubs, or gallops  ABDOMEN: Soft, Nontender, Nondistended; Bowel sounds present  EXTREMITIES:  2+ Peripheral Pulses, No clubbing, cyanosis, or edema  SKIN: No rashes or lesions    LABS:                        13.3   10.49 )-----------( 237      ( 2019 07:08 )             40.2     -    141  |  111<H>  |  8   ----------------------------<  106<H>  3.5   |  24  |  0.97    Ca    8.1<L>      2019 07:08  Phos  2.8       Mg     2.2         TPro  6.8  /  Alb  3.0<L>  /  TBili  0.4  /  DBili  x   /  AST  19  /  ALT  14  /  AlkPhos  66  -21    PT/INR - ( 2019 14:54 )   PT: 11.1 sec;   INR: 1.00 ratio         PTT - ( 2019 14:54 )  PTT:27.6 sec  Urinalysis Basic - ( 2019 14:54 )    Color: Yellow / Appearance: Clear / S.010 / pH: x  Gluc: x / Ketone: Negative  / Bili: Negative / Urobili: Negative   Blood: x / Protein: Negative / Nitrite: Negative   Leuk Esterase: Negative / RBC: x / WBC x   Sq Epi: x / Non Sq Epi: x / Bacteria: x      CAPILLARY BLOOD GLUCOSE  POCT Blood Glucose.: 105 mg/dL (2019 08:45)  POCT Blood Glucose.: 105 mg/dL (2019 05:59)  POCT Blood Glucose.: 147 mg/dL (2019 00:30)        RADIOLOGY & ADDITIONAL TESTS:    Imaging Personally Reviewed:  [x] YES  [ ] NO    Consultant(s) Notes Reviewed:  [x] YES  [ ] NO    Care Discussed with Consultants/Other Providers [x] YES  [ ] NO

## 2019-07-21 NOTE — PROGRESS NOTE ADULT - SUBJECTIVE AND OBJECTIVE BOX
The patient's frontal headache is slightly improved, but still exists. Dizziness has completely resolved.  Normal neurological examination, NIHSS 0, MRS 1.    Recs:  - Encourage plentiful fluid hydration (2L water daily)  - Provide conservative pain management for headache (acetaminophen or NSAIDs); avoid opiates to prevent worsening of disorientation  - Meclizine PRN dizziness/vertigo  - May consider carotid Doppler to evaluate blood flow to head.  - If carotid Doppler shows stenosis, may consider CTA Head for further evaluation of intracranial vasculature  - Monitor vital signs; if irregular, consider cardiovascular workup (Echo, Telemetry)      Neurology Follow up note    Name  MICHAEL DAMICO    HPI:  70 y/o M poor historian patient presents to the ED w/ dizziness and headache that began last night w/ suspicion of a heat-related illness as per patient. Patient notes he was short of breath before but now is not.  Patient mentioned he does not have A/C at home with hot weather these days. Patient denies LOC or passing out. Patient denies vomiting, fever, diarrhea, or urinary symptoms. Patient denies smoking, EtOH use, or any other drug use. NKDA.  In ED:   EKG and labs are unremarkable. (20 Jul 2019 17:49)      Interval History -        Subjective:        MEDICATIONS  (STANDING):  cyanocobalamin Injectable 1000 MICROGram(s) IntraMuscular daily  dextrose 5% + sodium chloride 0.9%. 1000 milliLiter(s) (70 mL/Hr) IV Continuous <Continuous>  enoxaparin Injectable 40 milliGRAM(s) SubCutaneous daily    MEDICATIONS  (PRN):  acetaminophen   Tablet .. 650 milliGRAM(s) Oral every 6 hours PRN Mild Pain (1 - 3)  meclizine 25 milliGRAM(s) Oral every 8 hours PRN Dizziness  ondansetron Injectable 4 milliGRAM(s) IV Push every 6 hours PRN Nausea and/or Vomiting      Allergies    No Known Allergies    Intolerances        Review of Systems:  General: [ ] None, [ ] chills, [ ]fatigue, [ ] fevers  Skin: [ ] None, [ ] rash   HEENT: [ ] None, [ ] head injury, [ ] blurred vision, [ ] double vision, [ ] eye pain, [ ] visual loss, [ ] hearing loss, [ ] deafness, [ ] ear pain, [ ] ringing in the ears, [ ] vertigo, [ ] sinus pain, [ ] voice changes  Neck: [ ] None, [ ] neck stiffness  Respiratory: [ ] None, [ ] cough, [ ] difficulty breathing  Cardiovascular: [ ] None, [ ] calf cramps, [ ] chest pain, [ ] leg pain, [ ] swelling, [ ] rapid heart rate, [ ] shortness of breath  Gastrointestinal: [ ] None, [ ] abdominal pain, [ ] nausea, [ ] vomiting  Musculoskeletal: [ ] None, [ ] back pain, [ ] joint pain, [ ] joint stiffness, [ ] leg cramps, [ ] muscle atrophy, [ ] muscle cramps, [ ] muscle weakness, [ ] swelling of extremities  Neurological: [ ] None, [ ] Dizziness, [ ] decreased memory, [ ] fainting, [ ] focal neurological symptoms, [ ] headaches, [ ] incontinence of stool, [ ] incontinence of urine, [ ] loss of consciousness, [ ] numbness, [ ] seizures, [ ] spinning sensation, [ ] stroke, [ ] trouble walking, [ ] unsteadiness, [ ] visual changes, [ ] weakness  Psychiatric: [ ] None,  [ ] depression, [ ] anxiety, [ ] hallucinations, [ ] inability to concentrate, [ ] mood changes, [ ] panic attacks  Hematology: [ ] None,  [ ] blood clots, [ ] spontaneous bleeding      Objective:   Vital Signs Last 24 Hrs  T(C): 36.8 (21 Jul 2019 16:40), Max: 36.8 (21 Jul 2019 16:40)  T(F): 98.3 (21 Jul 2019 16:40), Max: 98.3 (21 Jul 2019 16:40)  HR: 70 (21 Jul 2019 16:40) (70 - 94)  BP: 120/64 (21 Jul 2019 16:40) (120/64 - 143/73)  BP(mean): --  RR: 17 (21 Jul 2019 16:40) (17 - 17)  SpO2: 100% (21 Jul 2019 16:40) (99% - 100%)    General Exam:   General appearance: No acute distress                 Cardiovascular: Pedal dorsalis pulses intact bilaterally    Neurological Exam:  Mental Status: Orientated to self, date and place.  Attention intact.  No dysarthria, aphasia or neglect.  Knowledge intact.  Registration intact.  Short and long term memory grossly intact.      Cranial Nerves: CN I - not tested.  PERRL, EOMI, VFF, no nystagmus or diplopia.  No APD.  Fundi not visualized bilaterally.  CN V1-3 intact to light touch and pinprick.  No facial asymmetry.  Hearing intact to finger rub bilaterally.  Tongue, uvula and palate midline.  Sternocleidomastoid and Trapezius intact bilaterally.    Motor:   Tone: normal.                  Strength: intact throughout  Pronator drift: none                 Dysmeria: None to finger-nose-finger or heel-shin-heel  No truncal ataxia.    Tremor: No resting, postural or action tremor.  No myoclonus.    Sensation: intact to light touch, pinprick, vibration and proprioception    Deep Tendon Reflexes: 1+ bilateral biceps, triceps, brachioradialis, knee and ankle  Toes flexor bilaterally    Gait: normal and stable.      Other:    07-21    141  |  111<H>  |  8   ----------------------------<  106<H>  3.5   |  24  |  0.97    Ca    8.1<L>      21 Jul 2019 07:08  Phos  2.8     07-21  Mg     2.2     07-21    TPro  6.8  /  Alb  3.0<L>  /  TBili  0.4  /  DBili  x   /  AST  19  /  ALT  14  /  AlkPhos  66  07-21 07-21    141  |  111<H>  |  8   ----------------------------<  106<H>  3.5   |  24  |  0.97    Ca    8.1<L>      21 Jul 2019 07:08  Phos  2.8     07-21  Mg     2.2     07-21    TPro  6.8  /  Alb  3.0<L>  /  TBili  0.4  /  DBili  x   /  AST  19  /  ALT  14  /  AlkPhos  66  07-21    LIVER FUNCTIONS - ( 21 Jul 2019 07:08 )  Alb: 3.0 g/dL / Pro: 6.8 g/dL / ALK PHOS: 66 U/L / ALT: 14 U/L DA / AST: 19 U/L / GGT: x             Radiology    EKG:  tele:  TTE:  EEG:                 Please contact the Neurology consult service with any questions.    Jerardo Tyler MD  Neurology Attending  [facility] Neurology Follow up note    Name  MICHAEL DAMICO    HPI:  68 y/o M poor historian patient presents to the ED w/ dizziness and headache that began last night w/ suspicion of a heat-related illness as per patient. Patient notes he was short of breath before but now is not.  Patient mentioned he does not have A/C at home with hot weather these days. Patient denies LOC or passing out. Patient denies vomiting, fever, diarrhea, or urinary symptoms. Patient denies smoking, EtOH use, or any other drug use. NKDA.  In ED: EKG and labs are unremarkable. (20 Jul 2019 17:49)    Neuro HPI:  69 LHM who experienced dizziness (not room-spinning sensation) and frontal headache while at home. Dizziness is resolved, headache is persisting. No loss of consciousness, photophobia, phonophobia, nausea, or emesis. He has not had previous similar symptoms.    Interval History -  The patient's frontal headache is slightly improved, but still exists. His dizziness has completely resolved.    MEDICATIONS  (STANDING):  cyanocobalamin Injectable 1000 MICROGram(s) IntraMuscular daily  dextrose 5% + sodium chloride 0.9%. 1000 milliLiter(s) (70 mL/Hr) IV Continuous <Continuous>  enoxaparin Injectable 40 milliGRAM(s) SubCutaneous daily    MEDICATIONS  (PRN):  acetaminophen   Tablet .. 650 milliGRAM(s) Oral every 6 hours PRN Mild Pain (1 - 3)  meclizine 25 milliGRAM(s) Oral every 8 hours PRN Dizziness  ondansetron Injectable 4 milliGRAM(s) IV Push every 6 hours PRN Nausea and/or Vomiting    Allergies  No Known Allergies    Review of Systems: Fourteen systems reviewed and negative except as in HPI / Interval History.      Objective:   Vital Signs Last 24 Hrs  T(C): 36.8 (21 Jul 2019 16:40), Max: 36.8 (21 Jul 2019 16:40)  T(F): 98.3 (21 Jul 2019 16:40), Max: 98.3 (21 Jul 2019 16:40)  HR: 70 (21 Jul 2019 16:40) (70 - 94)  BP: 120/64 (21 Jul 2019 16:40) (120/64 - 143/73  RR: 17 (21 Jul 2019 16:40) (17 - 17)  SpO2: 100% (21 Jul 2019 16:40) (99% - 100%)    General Exam:  General: No acute distress  Respiratory: CTAB/l.  No crackles, rhonchi, or wheezes.  Cardiovascular: RRR, No murmurs, Full b/l radial and pedal pulses    Neurological Exam:  General / Mental Status: Oriented to person and place, but not to time.  No dysarthria or aphasia present.  Naming and repetition intact.  Cranial Nerves: PERRLA, EOMI x 2, VFF x 4, No nystagmus or diplopia.  B/l V1-V3 equal to light touch and pinprick.  Symmetric facial movement and palate elevation.  B/l hearing equal to finger rub.  5/5 strength with b/l sternocleidomastoid and trapezius.  Midline tongue protrusion with no atrophy or fasciculations.  Motor: Normal bulk and tone in all four extremities.  5/5 strength throughout all four extremities.  No downward drift, rigidity, spasticity, or tremors in any of the four extremities.  Sensation: Intact to light touch and pinprick in all four extremities.  Coordination: No dysmetria with b/l finger-to-nose and heel raise tests.  Reflexes: 2+ and symmetric at b/l biceps, triceps, brachioradialis, patellae, and ankles.  Toes flexor b/l.  Gait and Romberg testing deferred per patient request due to headache.     NIHSS Score:    LOC - 0  LOC Questions - 0  LOC Commands - 0  Gaze Preference - 0  Visual Fields - 0  Facial Palsy - 0  Motor Arm Left - 0  Motor Arm Right - 0  Motor Leg Left - 0  Motor Leg Right - 0  Limb Ataxia - 0  Sensory - 0  Language - 0  Speech - 0  Extinction - 0    NIHSS Score Total: 0    Modified [place] Scale: 1      Other:    07-21    141  |  111<H>  |  8   ----------------------------<  106<H>  3.5   |  24  |  0.97    Ca    8.1<L>      21 Jul 2019 07:08  Phos  2.8     07-21  Mg     2.2     07-21    TPro  6.8  /  Alb  3.0<L>  /  TBili  0.4  /  DBili  x   /  AST  19  /  ALT  14  /  AlkPhos  66  07-21    Hemoglobin A1C, Whole Blood in AM (07.21.19 @ 13:46)    Hemoglobin A1C, Whole Blood: 5.6%    Lipid Profile in AM (07.21.19 @ 07:08)    Total Cholesterol/HDL Ratio Measurement: 4.1 RATIO    Cholesterol, Serum: 209 mg/dL    Triglycerides, Serum: 127 mg/dL    HDL Cholesterol, Serum: 51 mg/dL    Direct LDL: 133 mg/dL      Neuroimaging:    CT Head (7/20/19):  - No acute intracranial abnormality  - Mild diffuse atrophy and mild ventriculomegaly      Assessment:  69 LHM with likely tension headache and dizziness in setting of heat    Recommendations:    - Encourage plentiful fluid hydration (2L water daily).    - Provide conservative pain management for headache (acetaminophen or NSAIDs); avoid opiates to prevent worsening of disorientation.    - Meclizine PRN dizziness/vertigo.    - May consider carotid Doppler to evaluate blood flow to head.    - If carotid Doppler shows stenosis, may consider CTA Head for further evaluation of intracranial vasculature.    - Monitor vital signs; if irregular, consider cardiovascular workup (Echo, Telemetry)      Please contact the Neurology consult service with any questions.    Jerardo Tyler MD  Neurology Attending  [facility]

## 2019-07-21 NOTE — CHART NOTE - NSCHARTNOTEFT_GEN_A_CORE
EVENT: patient c/o nausea.     OBJECTIVE:  Vital Signs Last 24 Hrs  T(C): 36.7 (20 Jul 2019 21:17), Max: 37.3 (20 Jul 2019 17:20)  T(F): 98.1 (20 Jul 2019 21:17), Max: 99.1 (20 Jul 2019 17:20)  HR: 94 (20 Jul 2019 21:17) (88 - 116)  BP: 143/73 (20 Jul 2019 21:17) (104/60 - 143/73)  BP(mean): --  RR: 17 (20 Jul 2019 21:17) (16 - 18)  SpO2: 99% (20 Jul 2019 21:17) (97% - 99%)    FOCUSED PHYSICAL EXAM:  Neuro: awake, alert, oriented x 3. No neuro deficit  Cardiovascular: Pulses +2 B/L in lower and upper extremities, HR regular, BP stable, No edema.  Respiratory: Respirations regular, unlabored, breath sounds clear B/L.   GI: Abdomen soft, non-tender, positive bowel sounds.  : no bladder distention noted. No complaints at this time.  Skin: Dry, intact, no bruising, no diaphoresis.    I&O's      LABS:                        14.3   9.78  )-----------( 245      ( 20 Jul 2019 12:41 )             42.4   CARDIAC MARKERS ( 20 Jul 2019 12:41 )  <0.015 ng/mL / x     / 102 U/L / x     / x        07-20    137  |  105  |  15  ----------------------------<  109<H>  3.8   |  22  |  1.05    Ca    8.4      20 Jul 2019 12:41    TPro  8.0  /  Alb  3.5  /  TBili  0.4  /  DBili  x   /  AST  15  /  ALT  17  /  AlkPhos  78  07-20        MICROBIOLOGY:        EKG:   IMGAGING:    ASSESSMENT:  HPI:  70 y/o M poor historian patient presents to the ED w/ dizziness and headache that began last night w/ suspicion of a heat-related illness as per patient. Patient notes he was short of breath before but now is not.  Patient mentioned he does not have A/C at home with hot weather these days. Patient denies LOC or passing out. Patient denies vomiting, fever, diarrhea, or urinary symptoms. Patient denies smoking, EtOH use, or any other drug use. NKDA.  In ED:   EKG and labs are unremarkable. (20 Jul 2019 17:49)      PLAN:   1. Zofran IVP Q 6 hours PRN for nausea/vomiting    FOLLOW UP / RESULT:

## 2019-07-22 ENCOUNTER — TRANSCRIPTION ENCOUNTER (OUTPATIENT)
Age: 69
End: 2019-07-22

## 2019-07-22 DIAGNOSIS — K59.00 CONSTIPATION, UNSPECIFIED: ICD-10-CM

## 2019-07-22 PROBLEM — Z78.9 OTHER SPECIFIED HEALTH STATUS: Chronic | Status: ACTIVE | Noted: 2019-07-20

## 2019-07-22 LAB
24R-OH-CALCIDIOL SERPL-MCNC: 13.7 NG/ML — LOW (ref 30–80)
ANION GAP SERPL CALC-SCNC: 8 MMOL/L — SIGNIFICANT CHANGE UP (ref 5–17)
BASOPHILS # BLD AUTO: 0.02 K/UL — SIGNIFICANT CHANGE UP (ref 0–0.2)
BASOPHILS NFR BLD AUTO: 0.2 % — SIGNIFICANT CHANGE UP (ref 0–2)
BUN SERPL-MCNC: 10 MG/DL — SIGNIFICANT CHANGE UP (ref 7–18)
CALCIUM SERPL-MCNC: 8.3 MG/DL — LOW (ref 8.4–10.5)
CHLORIDE SERPL-SCNC: 110 MMOL/L — HIGH (ref 96–108)
CO2 SERPL-SCNC: 25 MMOL/L — SIGNIFICANT CHANGE UP (ref 22–31)
CREAT SERPL-MCNC: 1.09 MG/DL — SIGNIFICANT CHANGE UP (ref 0.5–1.3)
EOSINOPHIL # BLD AUTO: 0.07 K/UL — SIGNIFICANT CHANGE UP (ref 0–0.5)
EOSINOPHIL NFR BLD AUTO: 0.7 % — SIGNIFICANT CHANGE UP (ref 0–6)
GLUCOSE BLDC GLUCOMTR-MCNC: 105 MG/DL — HIGH (ref 70–99)
GLUCOSE BLDC GLUCOMTR-MCNC: 129 MG/DL — HIGH (ref 70–99)
GLUCOSE BLDC GLUCOMTR-MCNC: 88 MG/DL — SIGNIFICANT CHANGE UP (ref 70–99)
GLUCOSE BLDC GLUCOMTR-MCNC: 99 MG/DL — SIGNIFICANT CHANGE UP (ref 70–99)
GLUCOSE SERPL-MCNC: 99 MG/DL — SIGNIFICANT CHANGE UP (ref 70–99)
HCT VFR BLD CALC: 41 % — SIGNIFICANT CHANGE UP (ref 39–50)
HGB BLD-MCNC: 13.4 G/DL — SIGNIFICANT CHANGE UP (ref 13–17)
IMM GRANULOCYTES NFR BLD AUTO: 0.4 % — SIGNIFICANT CHANGE UP (ref 0–1.5)
LYMPHOCYTES # BLD AUTO: 1.78 K/UL — SIGNIFICANT CHANGE UP (ref 1–3.3)
LYMPHOCYTES # BLD AUTO: 18.3 % — SIGNIFICANT CHANGE UP (ref 13–44)
MAGNESIUM SERPL-MCNC: 2.2 MG/DL — SIGNIFICANT CHANGE UP (ref 1.6–2.6)
MCHC RBC-ENTMCNC: 30.9 PG — SIGNIFICANT CHANGE UP (ref 27–34)
MCHC RBC-ENTMCNC: 32.7 GM/DL — SIGNIFICANT CHANGE UP (ref 32–36)
MCV RBC AUTO: 94.7 FL — SIGNIFICANT CHANGE UP (ref 80–100)
MONOCYTES # BLD AUTO: 0.48 K/UL — SIGNIFICANT CHANGE UP (ref 0–0.9)
MONOCYTES NFR BLD AUTO: 4.9 % — SIGNIFICANT CHANGE UP (ref 2–14)
NEUTROPHILS # BLD AUTO: 7.34 K/UL — SIGNIFICANT CHANGE UP (ref 1.8–7.4)
NEUTROPHILS NFR BLD AUTO: 75.5 % — SIGNIFICANT CHANGE UP (ref 43–77)
NRBC # BLD: 0 /100 WBCS — SIGNIFICANT CHANGE UP (ref 0–0)
PHOSPHATE SERPL-MCNC: 2.9 MG/DL — SIGNIFICANT CHANGE UP (ref 2.5–4.5)
PLATELET # BLD AUTO: 221 K/UL — SIGNIFICANT CHANGE UP (ref 150–400)
POTASSIUM SERPL-MCNC: 3.6 MMOL/L — SIGNIFICANT CHANGE UP (ref 3.5–5.3)
POTASSIUM SERPL-SCNC: 3.6 MMOL/L — SIGNIFICANT CHANGE UP (ref 3.5–5.3)
RBC # BLD: 4.33 M/UL — SIGNIFICANT CHANGE UP (ref 4.2–5.8)
RBC # FLD: 13.1 % — SIGNIFICANT CHANGE UP (ref 10.3–14.5)
SODIUM SERPL-SCNC: 143 MMOL/L — SIGNIFICANT CHANGE UP (ref 135–145)
WBC # BLD: 9.73 K/UL — SIGNIFICANT CHANGE UP (ref 3.8–10.5)
WBC # FLD AUTO: 9.73 K/UL — SIGNIFICANT CHANGE UP (ref 3.8–10.5)

## 2019-07-22 PROCEDURE — 93880 EXTRACRANIAL BILAT STUDY: CPT | Mod: 26

## 2019-07-22 RX ORDER — POLYETHYLENE GLYCOL 3350 17 G/17G
17 POWDER, FOR SOLUTION ORAL DAILY
Refills: 0 | Status: DISCONTINUED | OUTPATIENT
Start: 2019-07-22 | End: 2019-07-23

## 2019-07-22 RX ORDER — MECLIZINE HCL 12.5 MG
1 TABLET ORAL
Qty: 45 | Refills: 0
Start: 2019-07-22 | End: 2019-08-05

## 2019-07-22 RX ADMIN — ENOXAPARIN SODIUM 40 MILLIGRAM(S): 100 INJECTION SUBCUTANEOUS at 14:05

## 2019-07-22 RX ADMIN — POLYETHYLENE GLYCOL 3350 17 GRAM(S): 17 POWDER, FOR SOLUTION ORAL at 14:06

## 2019-07-22 RX ADMIN — PREGABALIN 1000 MICROGRAM(S): 225 CAPSULE ORAL at 14:06

## 2019-07-22 NOTE — DISCHARGE NOTE PROVIDER - PROVIDER TOKENS
PROVIDER:[TOKEN:[[id number]]],PROVIDER:[TOKEN:[[id number]]] PROVIDER:[TOKEN:[[id number]]],PROVIDER:[TOKEN:[[id number]]]

## 2019-07-22 NOTE — PROGRESS NOTE ADULT - SUBJECTIVE AND OBJECTIVE BOX
Patient is a 69y old  Male who presents with a chief complaint of Dizziness (22 Jul 2019 10:41)      INTERVAL HPI/OVERNIGHT EVENTS: pt seen and examined, no overnight acute events, still c/o intermittent headache , 5/10 . patient is non cooperative , poor historian . No bowel movement since admission, c/o dizziness      REVIEW OF SYSTEMS:  CONSTITUTIONAL: No fever, weight loss, or fatigue  EYES: No eye pain, visual disturbances, or discharge  ENMT:  dizziness  BREASTS: No pain, masses, or nipple discharge  RESPIRATORY: No cough, wheezing, chills or hemoptysis; No shortness of breath  CARDIOVASCULAR: No chest pain, palpitations, dizziness, or leg swelling  GASTROINTESTINAL: No abdominal or epigastric pain. No nausea, vomiting, or hematemesis; No diarrhea or constipation. No melena or hematochezia.  GENITOURINARY: No dysuria, frequency, hematuria, or incontinence  NEUROLOGICAL:  intermittent headache  SKIN: No itching, burning, rashes, or lesions   LYMPH NODES: No enlarged glands  ENDOCRINE: No heat or cold intolerance; No hair loss  MUSCULOSKELETAL: No joint pain or swelling; No muscle, back, or extremity pain  HEME/LYMPH: No easy bruising, or bleeding gums  ALLERY AND IMMUNOLOGIC: No hives or eczema    FAMILY HISTORY:  No pertinent family history in first degree relatives    Vital Signs Last 24 Hrs  T(C): 36.7 (22 Jul 2019 08:03), Max: 37.4 (21 Jul 2019 23:59)  T(F): 98.1 (22 Jul 2019 08:03), Max: 99.3 (21 Jul 2019 23:59)  HR: 73 (22 Jul 2019 08:03) (70 - 79)  BP: 129/65 (22 Jul 2019 08:03) (120/64 - 129/65)  BP(mean): --  RR: 16 (22 Jul 2019 08:03) (16 - 17)  SpO2: 99% (22 Jul 2019 08:03) (97% - 100%)    07-21-19 @ 07:01  -  07-22-19 @ 07:00  --------------------------------------------------------  IN: 70 mL / OUT: 601 mL / NET: -531 mL        PHYSICAL EXAM:  GENERAL: NAD,poor hygiene   HEAD:  Atraumatic, Normocephalic  EYES: EOMI, PERRLA, conjunctiva and sclera clear  ENMT: No tonsillar erythema, exudates, or enlargement; Moist mucous membranes, Good dentition, No lesions  NECK: Supple, No JVD, Normal thyroid  NERVOUS SYSTEM:  Alert & Oriented X3, Good concentration; Motor Strength 5/5 B/L upper and lower extremities; DTRs 2+ intact and symmetric  CHEST/LUNG: Clear to percussion bilaterally; No rales, rhonchi, wheezing, or rubs  HEART: Regular rate and rhythm; No murmurs, rubs, or gallops  ABDOMEN: Soft, Nontender, Nondistended; Bowel sounds present  EXTREMITIES:  2+ Peripheral Pulses, No clubbing, cyanosis, or edema  LYMPH: No lymphadenopathy noted  SKIN: No rashes or lesions    Consultant(s) Notes Reviewed:  [x ] YES  [ ] NO  Care Discussed with Consultants/Other Providers [ x] YES  [ ] NO    LABS:                        13.4   9.73  )-----------( 221      ( 22 Jul 2019 06:50 )             41.0   07-22    143  |  110<H>  |  10  ----------------------------<  99  3.6   |  25  |  1.09    Ca    8.3<L>      22 Jul 2019 06:50  Phos  2.9     07-22  Mg     2.2     07-22    TPro  6.8  /  Alb  3.0<L>  /  TBili  0.4  /  DBili  x   /  AST  19  /  ALT  14  /  AlkPhos  66  07-21          RADIOLOGY & ADDITIONAL TESTS:  < from: CT Head No Cont (07.20.19 @ 20:41) >  IMPRESSION:    1)  mild cerebral volume loss. Mild prominence of the ventricles.  2)  no acute infarct or hemorrhage.      Imaging Personally Reviewed:  [ ] YES  [ ] NO  acetaminophen   Tablet .. 650 milliGRAM(s) Oral every 6 hours PRN  cyanocobalamin Injectable 1000 MICROGram(s) IntraMuscular daily  dextrose 5% + sodium chloride 0.9%. 1000 milliLiter(s) IV Continuous <Continuous>  enoxaparin Injectable 40 milliGRAM(s) SubCutaneous daily  meclizine 25 milliGRAM(s) Oral every 8 hours PRN  ondansetron Injectable 4 milliGRAM(s) IV Push every 6 hours PRN  polyethylene glycol 3350 17 Gram(s) Oral daily      HEALTH ISSUES - PROBLEM Dx:  Vitamin B12 deficiency: Vitamin B12 deficiency  Prophylactic measure: Prophylactic measure  Heat effects of, initial encounter: Heat effects of, initial encounter  Dizziness: Dizziness Patient is a 69 year old male who presents with a chief complaint of Dizziness (22 Jul 2019 10:41)    INTERVAL HPI/OVERNIGHT EVENTS: Patient seen and examined, no overnight acute events, still c/o intermittent headache , 5/10 . Patient is non cooperative  poor historian. No bowel movement since admission, reports constipation and c/o dizziness    Medications:  acetaminophen   Tablet .. 650 milliGRAM(s) Oral every 6 hours PRN  cyanocobalamin Injectable 1000 MICROGram(s) IntraMuscular daily  dextrose 5% + sodium chloride 0.9%. 1000 milliLiter(s) IV Continuous <Continuous>  enoxaparin Injectable 40 milliGRAM(s) SubCutaneous daily  meclizine 25 milliGRAM(s) Oral every 8 hours PRN  ondansetron Injectable 4 milliGRAM(s) IV Push every 6 hours PRN  polyethylene glycol 3350 17 Gram(s) Oral daily      REVIEW OF SYSTEMS:  CONSTITUTIONAL: No fever, weight loss, has fatigue  EYES: No eye pain, visual disturbances, or discharge  ENMT:  dizziness  BREASTS: No pain, masses, or nipple discharge  RESPIRATORY: No cough, wheezing, chills or hemoptysis; No shortness of breath  CARDIOVASCULAR: No chest pain, palpitations, dizziness, or leg swelling  GASTROINTESTINAL: No abdominal or epigastric pain. No nausea, vomiting, or hematemesis; No diarrhea, has constipation. No melena or hematochezia.  GENITOURINARY: No dysuria, frequency, hematuria, or incontinence  NEUROLOGICAL:  intermittent headache  SKIN: No itching, burning, rashes, or lesions   ENDOCRINE: No heat or cold intolerance; No hair loss  MUSCULOSKELETAL: No joint pain or swelling; No muscle, back, or extremity pain  HEME/LYMPH: No easy bruising, or bleeding gums  ALLERGY AND IMMUNOLOGIC: No hives or eczema    FAMILY HISTORY:  No pertinent family history in first degree relatives    Vital Signs Last 24 Hrs  T(C): 36.7 (22 Jul 2019 08:03), Max: 37.4 (21 Jul 2019 23:59)  T(F): 98.1 (22 Jul 2019 08:03), Max: 99.3 (21 Jul 2019 23:59)  HR: 73 (22 Jul 2019 08:03) (70 - 79)  BP: 129/65 (22 Jul 2019 08:03) (120/64 - 129/65)  RR: 16 (22 Jul 2019 08:03) (16 - 17)  SpO2: 99% (22 Jul 2019 08:03) (97% - 100%)    07-21-19 @ 07:01  -  07-22-19 @ 07:00  --------------------------------------------------------  IN: 70 mL / OUT: 601 mL / NET: -531 mL        PHYSICAL EXAM:  GENERAL: NAD, poor hygiene   HEAD:  Atraumatic, Normocephalic  EYES: EOMI, PERRL, conjunctiva and sclera clear  ENMT: No tonsillar erythema, exudates, or enlargement; Moist mucous membranes, Good dentition, No lesions  NECK: Supple, No JVD, Normal thyroid  NERVOUS SYSTEM:  Alert & Oriented X2, Good concentration; Motor Strength 5/5 B/L upper and lower extremities; DTRs 2+ intact and symmetric  CHEST/LUNG: Clear to percussion bilaterally; No rales, rhonchi, wheezing, or rubs  HEART: Regular rate and rhythm; No murmurs, rubs, or gallops  ABDOMEN: Soft, Nontender, Nondistended; Bowel sounds present  EXTREMITIES:  2+ Peripheral Pulses, No clubbing, cyanosis, or edema  SKIN: No rashes or lesions    Consultant(s) Notes Reviewed:  [x ] YES  [ ] NO  Care Discussed with Consultants/Other Providers [ x] YES  [ ] NO    LABS:                        13.4   9.73  )-----------( 221      ( 22 Jul 2019 06:50 )             41.0   07-22    143  |  110<H>  |  10  ----------------------------<  99  3.6   |  25  |  1.09    Ca    8.3<L>      22 Jul 2019 06:50  Phos  2.9     07-22  Mg     2.2     07-22    TPro  6.8  /  Alb  3.0<L>  /  TBili  0.4  /  DBili  x   /  AST  19  /  ALT  14  /  AlkPhos  66  07-21          RADIOLOGY & ADDITIONAL TESTS:  < from: CT Head No Cont (07.20.19 @ 20:41) >  IMPRESSION:    1)  mild cerebral volume loss. Mild prominence of the ventricles.  2)  no acute infarct or hemorrhage.        HEALTH ISSUES - PROBLEM Dx:  Vitamin B12 deficiency  Heat effects of, initial encounter  Dizziness  Constipation

## 2019-07-22 NOTE — PROGRESS NOTE ADULT - ATTENDING COMMENTS
Patient seen and examined. F/u carotid dopplers, echo, PT eval, SW. Plan of care discussed with patient, and agrees, all questions answered.   Emily Suarez MD
Patient seen and examined. Patient's history, vitals, labs, imaging studies reviewed. Discussed with PGY-1, and PGY-3 resident on case, agree with note, with edits and they were educated on the diagnosis and management of above medical conditions. Plan of care discussed with patient, and agrees, all questions answered.   Emily Suarez MD

## 2019-07-22 NOTE — PROGRESS NOTE ADULT - PROBLEM SELECTOR PLAN 3
IMPROVE VTE Individual Risk Assessment    RISK                                                                Points    [  ] Previous VTE                                                  3    [  ] Thrombophilia                                               2    [  ] Lower limb paralysis                                      2        (unable to hold up >15 seconds)    [  ] Current Cancer                                              2         (within 6 months)  [X] Immobilization > 24 hrs                                1  [  ] ICU/CCU stay > 24 hours                              1  [X] Age > 60                                                      1    IMPROVE VTE Score _____2____  c/w Lovenox 40 mg qd  no need for GI ppx. continue vit B12 supplement

## 2019-07-22 NOTE — DISCHARGE NOTE PROVIDER - CARE PROVIDER_API CALL
Odalys Medrano (DO)  Internal Medicine  [address], [address]rd [place] [address]  Phone: [phone]  Fax: [phone]  Follow Up Time:     Jerardo Tyler)  Neurology  Epilepsy  [address], [address]  [place] [address]  Phone: [phone]  Follow Up Time: Jerardo Tyler)  Neurology  Epilepsy  [address] [facility] [address] [address], [address]  [place] [address]  Phone: [phone]  Follow Up Time:     Paloma Ocampo)  Internal Medicine  [address] [facility] [facility], [address]rd [place] [address]  Phone: [phone]  Fax: [phone]  Follow Up Time:

## 2019-07-22 NOTE — DISCHARGE NOTE PROVIDER - NSDCCPCAREPLAN_GEN_ALL_CORE_FT
PRINCIPAL DISCHARGE DIAGNOSIS  Diagnosis: Heat effects of, initial encounter  Assessment and Plan of Treatment: Pt was admitted to medical floor due to headache and dizziness due to heat and air conditioning malfunction. EKG and CT head was normal. Neurology was consulted who recommended Carotid doppler and Echocardiogram which can be done after discharge outpatient. NIHSS score was 0. Pt was just given IV hydration and is stable to go home as per attending.      SECONDARY DISCHARGE DIAGNOSES  Diagnosis: Heat exhaustion, initial encounter  Assessment and Plan of Treatment:     Diagnosis: Acute nonintractable headache, unspecified headache type  Assessment and Plan of Treatment: PRINCIPAL DISCHARGE DIAGNOSIS  Diagnosis: Heat effects of, initial encounter  Assessment and Plan of Treatment: Pt was admitted to medical floor due to headache and dizziness due to heat and air conditioning malfunction. EKG and CT head was normal. Neurology was consulted who recommended Carotid doppler and Echocardiogram. NIHSS score was 0. Pt was given IV hydrationi. Patient stable to go home      SECONDARY DISCHARGE DIAGNOSES  Diagnosis: Heat exhaustion, initial encounter  Assessment and Plan of Treatment: given IV fluids and hydration    Diagnosis: Vitamin B12 deficiency  Assessment and Plan of Treatment: Vitamin B12 deficiency - given supplement    Diagnosis: Constipation, unspecified constipation type  Assessment and Plan of Treatment: Constipation, unspecified constipation type - given bowel regimen    Diagnosis: Acute nonintractable headache, unspecified headache type  Assessment and Plan of Treatment: PRINCIPAL DISCHARGE DIAGNOSIS  Diagnosis: Heat effects of, initial encounter  Assessment and Plan of Treatment: Pt was admitted to medical floor due to headache and dizziness due to heat and air conditioning malfunction. EKG and CT head was normal. Neurology was consulted who recommended Carotid doppler and Echocardiogram normal. NIHSS score was 0. Pt was given IV hydrationi. Patient stable to go home      SECONDARY DISCHARGE DIAGNOSES  Diagnosis: Heat exhaustion, initial encounter  Assessment and Plan of Treatment: given IV fluids and hydration    Diagnosis: Vitamin B12 deficiency  Assessment and Plan of Treatment: Vitamin B12 deficiency - given supplement    Diagnosis: Constipation, unspecified constipation type  Assessment and Plan of Treatment: Constipation, unspecified constipation type - given bowel regimen    Diagnosis: Acute nonintractable headache, unspecified headache type  Assessment and Plan of Treatment: resolved

## 2019-07-22 NOTE — PROGRESS NOTE ADULT - PROBLEM SELECTOR PLAN 4
IMPROVE VTE Individual Risk Assessment    RISK                                                                Points    [  ] Previous VTE                                                  3    [  ] Thrombophilia                                               2    [  ] Lower limb paralysis                                      2        (unable to hold up >15 seconds)    [  ] Current Cancer                                              2         (within 6 months)  [X] Immobilization > 24 hrs                                1  [  ] ICU/CCU stay > 24 hours                              1  [X] Age > 60                                                      1    IMPROVE VTE Score _____2____  c/w Lovenox 40 mg qd  no need for GI ppx.
started  bowel regimen

## 2019-07-22 NOTE — PROGRESS NOTE ADULT - ASSESSMENT
68 y/o M poor historian patient presents to the ED w/ dizziness and headache that began last night w/ suspicion of a heat-related illness as per patient. Patient notes he was short of breath before but now is not.  pt mentioned he does not have A/C at home with hot weather these days. Patient denies LOC or passing out. Patient denies vomiting, fever, diarrhea, or urinary symptoms. Patient denies smoking, EtOH use, or any other drug use. NKDA.  In ED:   EKG and labs are unremarkable.   Pt is admitted for dizziness
68 y/o M poor historian patient presents to the ED w/ dizziness and headache that began last night w/ suspicion of a heat-related illness as per patient. Patient notes he was short of breath before but now is not.  pt mentioned he does not have A/C at home with hot weather these days. Patient denies LOC or passing out. Patient denies vomiting, fever, diarrhea, or urinary symptoms. Patient denies smoking, EtOH use, or any other drug use. NKDA.  In ED:   EKG and labs are unremarkable.   Pt is admitted for dizziness

## 2019-07-22 NOTE — PROGRESS NOTE ADULT - PROBLEM SELECTOR PLAN 1
- p/w headache , dizziness x 1 day   - no nystagmus  - Tashi ames pike test could not be assessed  - EKG: NSR   - upon my encounter : NIH SS is ZERO  - Fall precautions   - C/ Pain Meds Tylenol /Oxycodone PRN  - c/w meclizine 25 q 8 for now PRn  - f/u CT head to r/o posterior stroke circulation: no acute infarct or hemorrhage.  - f/u Orthostatics    [] f/u carotid doppler   []f/u Echo  [] f/u hba1c   [x] lipid panel : chol:209  [x] f/u PT Eval: no skilled PT needs  -[]S/S  []f/u Social work consult (pt does not have A/C at home)  ** Neurology consulted Dr. Tyler  [x]FU neurology consult:  consider carotid Doppler
- p/w headache , dizziness x 1 day   - no nystagmus  - Tashi ames pike test could not be assessed  - EKG: NSR   - give IV fluids and hydration  - Fall precautions   - C/ Pain Meds Tylenol   - c/w meclizine 25 q 8 for now PRn  - f/u CT head to r/o posterior stroke circulation   - f/u Orthostatics    - f/u carotid doppler   - f/u Echo  - f/u hba1c and lipid panel   - f/u PT Eval  - S/S  - f/u Social work consult (pt does not have AC at home)  -Neurology consulted Dr. Tyler

## 2019-07-22 NOTE — DISCHARGE NOTE PROVIDER - CARE PROVIDERS DIRECT ADDRESSES
,[email or web address],DirectAddress_Unknown ,DirectAddress_Unknown,[email or web address]

## 2019-07-22 NOTE — DISCHARGE NOTE PROVIDER - HOSPITAL COURSE
HPI: 70 y/o M poor historian patient presents to the ED w/ dizziness and headache that began last night w/ suspicion of a heat-related illness as per patient. Patient notes he was short of breath before but now is not.  Patient mentioned he does not have A/C at home with hot weather these days. Patient denies LOC or passing out. Patient denies vomiting, fever, diarrhea, or urinary symptoms. Patient denies smoking, EtOH use, or any other drug use. NKDA.    In ED: EKG and labs are unremarkable. (20 Jul 2019 17:49)        Pt was admitted to medical floor due to headache and dizziness due to heat and air conditioning malfunction. EKG and CT head was normal. Neurology was consulted who recommended Carotid doppler and Echocardiogram which can be done after discharge outpatient. NIHSS score was 0. Pt was just given IV hydration and is stable to go home as per attending. HPI: 68 y/o M poor historian patient presents to the ED w/ dizziness and headache that began last night w/ suspicion of a heat-related illness as per patient. Patient notes he was short of breath before but now is not.  Patient mentioned he does not have A/C at home with hot weather these days. Patient denies LOC or passing out. Patient denies vomiting, fever, diarrhea, or urinary symptoms. Patient denies smoking, EtOH use, or any other drug use. NKDA.    In ED: EKG and labs are unremarkable. (20 Jul 2019 17:49)        Pt was admitted to medical floor due to headache and dizziness due to heat and air conditioning malfunction. EKG and CT head was normal. Neurology was consulted who recommended Carotid doppler and Echocardiogram. NIHSS score was 0. Pt was just given IV hydration and is stable to go home as per attending. HPI: 68 y/o M poor historian patient presents to the ED w/ dizziness and headache that began last night w/ suspicion of a heat-related illness as per patient. Patient notes he was short of breath before but now is not.  Patient mentioned he does not have A/C at home with hot weather these days. Patient denies LOC or passing out. Patient denies vomiting, fever, diarrhea, or urinary symptoms. Patient denies smoking, EtOH use, or any other drug use. NKDA.    In ED: EKG and labs are unremarkable. (20 Jul 2019 17:49)        Pt was admitted to medical floor due to headache and dizziness due to heat and air conditioning malfunction. EKG and CT head was normal. Neurology was consulted who recommended Carotid doppler and Echocardiogram. NIHSS score was 0. Pt was just given IV hydration and is stable to go home as per attending.         The patient was discharged home in stable condition to be followed up as an outpatient. HPI: 70 y/o male poor historian patient presents to the ED w/ dizziness and headache that began night prior to admission w/ suspicion of a heat-related illness as per patient. Patient notes he was short of breath before but now is not.  Patient mentioned he does not have air condition at home with hot weather these days. Patient denies LOC or passing out. Patient denies vomiting, fever, diarrhea, or urinary symptoms. Patient denies smoking, EtOH use, or any other drug use. NKDA.    In ED: EKG and labs are unremarkable. (20 Jul 2019 17:49)        Pt was admitted to medical floor due to headache and dizziness due to heat and air conditioning malfunction. EKG and CT head was normal. Neurology was consulted who recommended carotid doppler and Echocardiogram. NIHSS score was 0. Patient was given IV hydration and is stable to go home as per attending.         The patient was discharged home in stable condition to be followed up as an outpatient. HPI: 70 y/o male poor historian patient presents to the ED w/ dizziness and headache that began night prior to admission w/ suspicion of a heat-related illness as per patient. Patient notes he was short of breath before but now is not.  Patient mentioned he does not have air condition at home with hot weather these days. Patient denies LOC or passing out. Patient denies vomiting, fever, diarrhea, or urinary symptoms. Patient denies smoking, EtOH use, or any other drug use. NKDA.    In ED: EKG and labs are unremarkable. (20 Jul 2019 17:49)        Pt was admitted to medical floor due to headache and dizziness due to heat and air conditioning malfunction. EKG and CT head was normal. Neurology was consulted who recommended carotid doppler and Echocardiogram. NIHSS score was 0. Patient was given IV hydration and is stable to go home as per attending. Pt will follow up with Dr Damaris Dow @ [address] [place] [address] as he does not have primary doctor.         The patient was discharged home in stable condition to be followed up as an outpatient. HPI: 68 y/o male poor historian patient presents to the ED w/ dizziness and headache that began night prior to admission w/ suspicion of a heat-related illness as per patient. Patient notes he was short of breath before but now is not.  Patient mentioned he does not have air condition at home with hot weather these days. Patient denies LOC or passing out. Patient denies vomiting, fever, diarrhea, or urinary symptoms. Patient denies smoking, EtOH use, or any other drug use. NKDA.    In ED: EKG and color doppler and labs are unremarkable. (20 Jul 2019 17:49)        Pt was admitted to medical floor due to headache and dizziness due to heat and air conditioning malfunction. EKG and CT head was normal. Neurology was consulted who recommended carotid doppler and Echocardiogram. NIHSS score was 0. Patient was given IV hydration and is stable to go home as per attending. Pt will follow up with Dr Damaris Dow @ [address] as he does not have primary doctor.         The patient was discharged home in stable condition to be followed up as an outpatient. HPI: 70 y/o male poor historian patient presents to the ED w/ dizziness and headache that began night prior to admission w/ suspicion of a heat-related illness as per patient. Patient notes he was short of breath before but now is not.  Patient mentioned he does not have air condition at home with hot weather these days. Patient denies LOC or passing out. Patient denies vomiting, fever, diarrhea, or urinary symptoms. Patient denies smoking, EtOH use, or any other drug use. NKDA.    In ED: EKG and color doppler and labs are unremarkable. (20 Jul 2019 17:49)        Pt was admitted to medical floor due to headache and dizziness due to heat and air conditioning malfunction. EKG and CT head was normal. Neurology was consulted who recommended carotid doppler and Echocardiogram. NIHSS score was 0. Patient was given IV hydration and is stable to go home as per attending. Patient will follow up with Dr Damaris Dow @ [address] [place] [address] as he does not have primary doctor.         The patient was discharged home in stable condition to be followed up as an outpatient.

## 2019-07-22 NOTE — PROGRESS NOTE ADULT - PROBLEM SELECTOR PLAN 2
- p/w headache , dizziness x 1 day   - Will give IV fluids and hydration  [] f/u Social work consult for Ac arrangement - p/w headache , dizziness x 1 day   - Will give IV fluids and hydration  [] f/u Social work consult for safe arrangements

## 2019-07-23 ENCOUNTER — TRANSCRIPTION ENCOUNTER (OUTPATIENT)
Age: 69
End: 2019-07-23

## 2019-07-23 VITALS
HEART RATE: 84 BPM | OXYGEN SATURATION: 98 % | TEMPERATURE: 98 F | SYSTOLIC BLOOD PRESSURE: 127 MMHG | RESPIRATION RATE: 16 BRPM | DIASTOLIC BLOOD PRESSURE: 58 MMHG

## 2019-07-23 DIAGNOSIS — F43.20 ADJUSTMENT DISORDER, UNSPECIFIED: ICD-10-CM

## 2019-07-23 DIAGNOSIS — F60.9 PERSONALITY DISORDER, UNSPECIFIED: ICD-10-CM

## 2019-07-23 PROBLEM — Z00.00 ENCOUNTER FOR PREVENTIVE HEALTH EXAMINATION: Status: ACTIVE | Noted: 2019-07-23

## 2019-07-23 LAB
ANION GAP SERPL CALC-SCNC: 6 MMOL/L — SIGNIFICANT CHANGE UP (ref 5–17)
BUN SERPL-MCNC: 13 MG/DL — SIGNIFICANT CHANGE UP (ref 7–18)
CALCIUM SERPL-MCNC: 8.5 MG/DL — SIGNIFICANT CHANGE UP (ref 8.4–10.5)
CHLORIDE SERPL-SCNC: 108 MMOL/L — SIGNIFICANT CHANGE UP (ref 96–108)
CO2 SERPL-SCNC: 26 MMOL/L — SIGNIFICANT CHANGE UP (ref 22–31)
CREAT SERPL-MCNC: 1.13 MG/DL — SIGNIFICANT CHANGE UP (ref 0.5–1.3)
GLUCOSE SERPL-MCNC: 107 MG/DL — HIGH (ref 70–99)
HCT VFR BLD CALC: 42.9 % — SIGNIFICANT CHANGE UP (ref 39–50)
HGB BLD-MCNC: 14.3 G/DL — SIGNIFICANT CHANGE UP (ref 13–17)
MAGNESIUM SERPL-MCNC: 2.3 MG/DL — SIGNIFICANT CHANGE UP (ref 1.6–2.6)
MCHC RBC-ENTMCNC: 31.2 PG — SIGNIFICANT CHANGE UP (ref 27–34)
MCHC RBC-ENTMCNC: 33.3 GM/DL — SIGNIFICANT CHANGE UP (ref 32–36)
MCV RBC AUTO: 93.5 FL — SIGNIFICANT CHANGE UP (ref 80–100)
NRBC # BLD: 0 /100 WBCS — SIGNIFICANT CHANGE UP (ref 0–0)
PHOSPHATE SERPL-MCNC: 3.4 MG/DL — SIGNIFICANT CHANGE UP (ref 2.5–4.5)
PLATELET # BLD AUTO: 245 K/UL — SIGNIFICANT CHANGE UP (ref 150–400)
POTASSIUM SERPL-MCNC: 3.3 MMOL/L — LOW (ref 3.5–5.3)
POTASSIUM SERPL-SCNC: 3.3 MMOL/L — LOW (ref 3.5–5.3)
RBC # BLD: 4.59 M/UL — SIGNIFICANT CHANGE UP (ref 4.2–5.8)
RBC # FLD: 13.2 % — SIGNIFICANT CHANGE UP (ref 10.3–14.5)
SODIUM SERPL-SCNC: 140 MMOL/L — SIGNIFICANT CHANGE UP (ref 135–145)
WBC # BLD: 7.85 K/UL — SIGNIFICANT CHANGE UP (ref 3.8–10.5)
WBC # FLD AUTO: 7.85 K/UL — SIGNIFICANT CHANGE UP (ref 3.8–10.5)

## 2019-07-23 PROCEDURE — 80048 BASIC METABOLIC PNL TOTAL CA: CPT

## 2019-07-23 PROCEDURE — 99285 EMERGENCY DEPT VISIT HI MDM: CPT | Mod: 25

## 2019-07-23 PROCEDURE — 85730 THROMBOPLASTIN TIME PARTIAL: CPT

## 2019-07-23 PROCEDURE — 83735 ASSAY OF MAGNESIUM: CPT

## 2019-07-23 PROCEDURE — 84100 ASSAY OF PHOSPHORUS: CPT

## 2019-07-23 PROCEDURE — 93306 TTE W/DOPPLER COMPLETE: CPT

## 2019-07-23 PROCEDURE — 81003 URINALYSIS AUTO W/O SCOPE: CPT

## 2019-07-23 PROCEDURE — 82306 VITAMIN D 25 HYDROXY: CPT

## 2019-07-23 PROCEDURE — 82962 GLUCOSE BLOOD TEST: CPT

## 2019-07-23 PROCEDURE — 82746 ASSAY OF FOLIC ACID SERUM: CPT

## 2019-07-23 PROCEDURE — 93880 EXTRACRANIAL BILAT STUDY: CPT

## 2019-07-23 PROCEDURE — 86803 HEPATITIS C AB TEST: CPT

## 2019-07-23 PROCEDURE — 83036 HEMOGLOBIN GLYCOSYLATED A1C: CPT

## 2019-07-23 PROCEDURE — 82607 VITAMIN B-12: CPT

## 2019-07-23 PROCEDURE — 70450 CT HEAD/BRAIN W/O DYE: CPT

## 2019-07-23 PROCEDURE — 84443 ASSAY THYROID STIM HORMONE: CPT

## 2019-07-23 PROCEDURE — 85610 PROTHROMBIN TIME: CPT

## 2019-07-23 PROCEDURE — 93005 ELECTROCARDIOGRAM TRACING: CPT

## 2019-07-23 PROCEDURE — 80053 COMPREHEN METABOLIC PANEL: CPT

## 2019-07-23 PROCEDURE — 85027 COMPLETE CBC AUTOMATED: CPT

## 2019-07-23 PROCEDURE — 84484 ASSAY OF TROPONIN QUANT: CPT

## 2019-07-23 PROCEDURE — 36415 COLL VENOUS BLD VENIPUNCTURE: CPT

## 2019-07-23 PROCEDURE — 82550 ASSAY OF CK (CPK): CPT

## 2019-07-23 PROCEDURE — 80061 LIPID PANEL: CPT

## 2019-07-23 RX ORDER — PREGABALIN 225 MG/1
1 CAPSULE ORAL
Qty: 30 | Refills: 0
Start: 2019-07-23 | End: 2019-08-21

## 2019-07-23 NOTE — BEHAVIORAL HEALTH ASSESSMENT NOTE - HPI (INCLUDE ILLNESS QUALITY, SEVERITY, DURATION, TIMING, CONTEXT, MODIFYING FACTORS, ASSOCIATED SIGNS AND SYMPTOMS)
70 y/o M poor historian patient presents to the ED w/ dizziness and headache that began last night w/ suspicion of a heat-related illness as per patient. Patient notes he was short of breath before but now is not.  Patient mentioned he does not have A/C at home with hot weather these days. Patient denies LOC or passing out. Patient denies vomiting, fever, diarrhea, or urinary symptoms. Patient denies smoking, EtOH use, or any other drug use. NKDA.  In ED:   EKG and labs are unremarkable. (20 Jul 2019 17:49)    Patient denied psych history.  Denied using alcohol or illegal drugs.  Stated living with his 2 brothers.  Used to work as a bunch.  Denied feeling depresswed or anxious.  Patient is a/o x3, poor eye contact, verbal, behaviorally controlled.  Speech is goal directed logical.Mood-OK.  Denied s/h thought.Denied a/v hallucinations.Memory and cognition-fair.I&J-fair.

## 2019-07-23 NOTE — DISCHARGE NOTE NURSING/CASE MANAGEMENT/SOCIAL WORK - NSDCDPATPORTLINK_GEN_ALL_CORE
You can access the [facility], offered by [facility], by registering with the following website: [email or web address]

## 2019-07-23 NOTE — BEHAVIORAL HEALTH ASSESSMENT NOTE - NSBHCHARTREVIEWVS_PSY_A_CORE FT
Vital Signs Last 24 Hrs  T(C): 36.7 (23 Jul 2019 07:23), Max: 36.8 (23 Jul 2019 00:37)  T(F): 98.1 (23 Jul 2019 07:23), Max: 98.2 (23 Jul 2019 00:37)  HR: 84 (23 Jul 2019 07:23) (75 - 84)  BP: 127/58 (23 Jul 2019 07:23) (127/58 - 136/73)  BP(mean): --  RR: 16 (23 Jul 2019 07:23) (16 - 16)  SpO2: 98% (23 Jul 2019 07:23) (97% - 99%)

## 2019-07-23 NOTE — BEHAVIORAL HEALTH ASSESSMENT NOTE - NSBHCHARTREVIEWINVESTIGATE_PSY_A_CORE FT
Ventricular Rate 110 BPM    Atrial Rate 110 BPM    P-R Interval 144 ms    QRS Duration 90 ms    Q-T Interval 336 ms    QTC Calculation(Bezet) 454 ms    P Axis 69 degrees    R Axis 55 degrees    T Axis 57 degrees    Diagnosis Line Sinus tachycardia  Nonspecific T wave abnormality  Abnormal ECG

## 2019-07-23 NOTE — BEHAVIORAL HEALTH ASSESSMENT NOTE - NSBHCHARTREVIEWIMAGING_PSY_A_CORE FT
EXAM:  CT BRAIN                            PROCEDURE DATE:  07/20/2019          INTERPRETATION:  INDICATION:  Altered mental status  TECHNIQUE:  A non contrast 2.5mm axial CT study of the brain was   performed from skull base to vertex. Coronal andsagittal reformations   were generated from the axial data.  COMPARISON EXAMINATION:  No prior    FINDINGS:      HEMISPHERES:  There are artifacts from motion. There is mild generalized   volume loss.  VENTRICLES:  The ventricles are mildly prominentwithout transependymal   migration of CSF.  POSTERIOR FOSSA:  The brain stem and cerebellum are unremarkable.  No CP   angle lesion noted.  EXTRACEREBRAL SPACES:  No subdural or epidural collections are noted.  SKULL BASE AND CALVARIUM:  Appears intact.  No fracture or destructive   lesion is identified.  SINUSES AND MASTOIDS:  Clear.  MISCELLANEOUS:  No orbital or suprasellar abnormality noted.      IMPRESSION:    1)  mild cerebral volume loss. Mild prominence of the ventricles.  2)  no acute infarct or hemorrhage.    Follow-up MR imaging recommended for further assessment.

## 2019-07-23 NOTE — BEHAVIORAL HEALTH ASSESSMENT NOTE - SUMMARY
68 y/o M poor historian patient presents to the ED w/ dizziness and headache that began last night w/ suspicion of a heat-related illness as per patient. Patient notes he was short of breath before but now is not.  Patient mentioned he does not have A/C at home with hot weather these days. Patient denies LOC or passing out. Patient denies vomiting, fever, diarrhea, or urinary symptoms. Patient denies smoking, EtOH use, or any other drug use. NKDA.  In ED:   EKG and labs are unremarkable. (20 Jul 2019 17:49)  Patient denied psych history.  Denied using alcohol or illegal drugs.  Stated living with his 2 brothers.  Used to work as a bunch.  Denied feeling depresswed or anxious.  Patient is a/o x3, poor eye contact, verbal, behaviorally controlled.  Speech is goal directed logical.Mood-OK.  Denied s/h thought.Denied a/v hallucinations.Memory and cognition-fair.I&J-fair.

## 2019-07-23 NOTE — BEHAVIORAL HEALTH ASSESSMENT NOTE - NSBHCHARTREVIEWLAB_PSY_A_CORE FT
CBC Full  -  ( 23 Jul 2019 07:00 )  WBC Count : 7.85 K/uL  RBC Count : 4.59 M/uL  Hemoglobin : 14.3 g/dL  Hematocrit : 42.9 %  Platelet Count - Automated : 245 K/uL  Mean Cell Volume : 93.5 fl  Mean Cell Hemoglobin : 31.2 pg  Mean Cell Hemoglobin Concentration : 33.3 gm/dL  Auto Neutrophil # : x  Auto Lymphocyte # : x  Auto Monocyte # : x  Auto Eosinophil # : x  Auto Basophil # : x  Auto Neutrophil % : x  Auto Lymphocyte % : x  Auto Monocyte % : x  Auto Eosinophil % : x  Auto Basophil % : x

## 2019-09-03 ENCOUNTER — APPOINTMENT (OUTPATIENT)
Dept: INTERNAL MEDICINE | Facility: CLINIC | Age: 69
End: 2019-09-03

## 2020-02-02 NOTE — BEHAVIORAL HEALTH ASSESSMENT NOTE - MOOD
No concerning issues on day shift. Off floor in afternoon with PT. No stool today but continues on bowel regimen.No c/o of pain or discomfort.Will continue bowel regimen,PT. Notify team of any barriers.   Normal

## 2021-10-22 NOTE — PHYSICAL THERAPY INITIAL EVALUATION ADULT - MANUAL MUSCLE TESTING RESULTS, REHAB EVAL
Dyslipidemia  Dyslipidemia is an imbalance of waxy, fat-like substances (lipids) in the blood. The body needs lipids in small amounts. Dyslipidemia often involves a high level of cholesterol or triglycerides, which are types of lipids.  Common forms of dyslipidemia include:  High levels of LDL cholesterol. LDL is the type of cholesterol that causes fatty deposits (plaques) to build up in the blood vessels that carry blood away from your heart (arteries).  Low levels of HDL cholesterol. HDL cholesterol is the type of cholesterol that protects against heart disease. High levels of HDL remove the LDL buildup from arteries.  High levels of triglycerides. Triglycerides are a fatty substance in the blood that is linked to a buildup of plaques in the arteries.  What are the causes?  Primary dyslipidemia is caused by changes (mutations) in genes that are passed down through families (inherited). These mutations cause several types of dyslipidemia.  Secondary dyslipidemia is caused by lifestyle choices and diseases that lead to dyslipidemia, such as:  Eating a diet that is high in animal fat.  Not getting enough exercise.  Having diabetes, kidney disease, liver disease, or thyroid disease.  Drinking large amounts of alcohol.  Using certain medicines.  What increases the risk?  You are more likely to develop this condition if you are an older man or if you are a woman who has gone through menopause. Other risk factors include:  Having a family history of dyslipidemia.  Taking certain medicines, including birth control pills, steroids, some diuretics, and beta-blockers.  Smoking cigarettes.  Eating a high-fat diet.  Having certain medical conditions such as diabetes, polycystic ovary syndrome (PCOS), kidney disease, liver disease, or hypothyroidism.  Not exercising regularly.  Being overweight or obese with too much belly fat.  What are the signs or symptoms?  In most cases, dyslipidemia does not usually cause any  symptoms.  In severe cases, very high lipid levels can cause:  Fatty bumps under the skin (xanthomas).  White or gray ring around the black center (pupil) of the eye.  Very high triglyceride levels can cause inflammation of the pancreas (pancreatitis).  How is this diagnosed?  Your health care provider may diagnose dyslipidemia based on a routine blood test (fasting blood test). Because most people do not have symptoms of the condition, this blood testing (lipid profile) is done on adults age 20 and older and is repeated every 5 years. This test checks:  Total cholesterol. This measures the total amount of cholesterol in your blood, including LDL cholesterol, HDL cholesterol, and triglycerides. A healthy number is below 200.  LDL cholesterol. The target number for LDL cholesterol is different for each person, depending on individual risk factors. Ask your health care provider what your LDL cholesterol should be.  HDL cholesterol. An HDL level of 60 or higher is best because it helps to protect against heart disease. A number below 40 for men or below 50 for women increases the risk for heart disease.  Triglycerides. A healthy triglyceride number is below 150.  If your lipid profile is abnormal, your health care provider may do other blood tests.  How is this treated?  Treatment depends on the type of dyslipidemia that you have and your other risk factors for heart disease and stroke. Your health care provider will have a target range for your lipid levels based on this information.  For many people, this condition may be treated by lifestyle changes, such as diet and exercise. Your health care provider may recommend that you:  Get regular exercise.  Make changes to your diet.  Quit smoking if you smoke.  If diet changes and exercise do not help you reach your goals, your health care provider may also prescribe medicine to lower lipids. The most commonly prescribed type of medicine lowers your LDL cholesterol (statin  drug). If you have a high triglyceride level, your provider may prescribe another type of drug (fibrate) or an omega-3 fish oil supplement, or both.  Follow these instructions at home:    Eating and drinking  Follow instructions from your health care provider or dietitian about eating or drinking restrictions.  Eat a healthy diet as told by your health care provider. This can help you reach and maintain a healthy weight, lower your LDL cholesterol, and raise your HDL cholesterol. This may include:  Limiting your calories, if you are overweight.  Eating more fruits, vegetables, whole grains, fish, and lean meats.  Limiting saturated fat, trans fat, and cholesterol.  If you drink alcohol:  Limit how much you use.  Be aware of how much alcohol is in your drink. In the [place] one drink equals one 12 oz bottle of beer (355 mL), one 5 oz glass of wine (148 mL), or one 1½ oz glass of hard liquor (44 mL).  Do not drink alcohol if:  Your health care provider tells you not to drink.  You are pregnant, may be pregnant, or are planning to become pregnant.  Activity  Get regular exercise. Start an exercise and strength training program as told by your health care provider. Ask your health care provider what activities are safe for you. Your health care provider may recommend:  30 minutes of aerobic activity 4-6 days a week. Brisk walking is an example of aerobic activity.  Strength training 2 days a week.  General instructions  Do not use any products that contain nicotine or tobacco, such as cigarettes, e-cigarettes, and chewing tobacco. If you need help quitting, ask your health care provider.  Take over-the-counter and prescription medicines only as told by your health care provider. This includes supplements.  Keep all follow-up visits as told by your health care provider.  Contact a health care provider if:  You are:  Having trouble sticking to your exercise or diet plan.  Struggling to quit smoking or control your use of  "alcohol.  Summary  Dyslipidemia often involves a high level of cholesterol or triglycerides, which are types of lipids.  Treatment depends on the type of dyslipidemia that you have and your other risk factors for heart disease and stroke.  For many people, treatment starts with lifestyle changes, such as diet and exercise.  Your health care provider may prescribe medicine to lower lipids.  This information is not intended to replace advice given to you by your health care provider. Make sure you discuss any questions you have with your health care provider.  Document Revised: 08/12/2019 Document Reviewed: 07/19/2019  [employer] [place] Patient Education © 2021 [employer] [place] [employer]  Hypertension, Adult  High blood pressure (hypertension) is when the force of blood pumping through the arteries is too strong. The arteries are the blood vessels that carry blood from the heart throughout the body. Hypertension forces the heart to work harder to pump blood and may cause arteries to become narrow or stiff. Untreated or uncontrolled hypertension can cause a heart attack, heart failure, a stroke, kidney disease, and other problems.  A blood pressure reading consists of a higher number over a lower number. Ideally, your blood pressure should be below 120/80. The first (\"top\") number is called the systolic pressure. It is a measure of the pressure in your arteries as your heart beats. The second (\"bottom\") number is called the diastolic pressure. It is a measure of the pressure in your arteries as the heart relaxes.  What are the causes?  The exact cause of this condition is not known. There are some conditions that result in or are related to high blood pressure.  What increases the risk?  Some risk factors for high blood pressure are under your control. The following factors may make you more likely to develop this condition:  · Smoking.  · Having type 2 diabetes mellitus, high cholesterol, or both.  · Not getting enough exercise or physical " activity.  · Being overweight.  · Having too much fat, sugar, calories, or salt (sodium) in your diet.  · Drinking too much alcohol.  Some risk factors for high blood pressure may be difficult or impossible to change. Some of these factors include:  · Having chronic kidney disease.  · Having a family history of high blood pressure.  · Age. Risk increases with age.  · Race. You may be at higher risk if you are .  · Gender. Men are at higher risk than women before age 45. After age 65, women are at higher risk than men.  · Having obstructive sleep apnea.  · Stress.  What are the signs or symptoms?  High blood pressure may not cause symptoms. Very high blood pressure (hypertensive crisis) may cause:  · Headache.  · Anxiety.  · Shortness of breath.  · Nosebleed.  · Nausea and vomiting.  · Vision changes.  · Severe chest pain.  · Seizures.  How is this diagnosed?  This condition is diagnosed by measuring your blood pressure while you are seated, with your arm resting on a flat surface, your legs uncrossed, and your feet flat on the floor. The cuff of the blood pressure monitor will be placed directly against the skin of your upper arm at the level of your heart. It should be measured at least twice using the same arm. Certain conditions can cause a difference in blood pressure between your right and left arms.  Certain factors can cause blood pressure readings to be lower or higher than normal for a short period of time:  · When your blood pressure is higher when you are in a health care provider's office than when you are at home, this is called white coat hypertension. Most people with this condition do not need medicines.  · When your blood pressure is higher at home than when you are in a health care provider's office, this is called masked hypertension. Most people with this condition may need medicines to control blood pressure.  If you have a high blood pressure reading during one visit or you have  normal blood pressure with other risk factors, you may be asked to:  · Return on a different day to have your blood pressure checked again.  · Monitor your blood pressure at home for 1 week or longer.  If you are diagnosed with hypertension, you may have other blood or imaging tests to help your health care provider understand your overall risk for other conditions.  How is this treated?  This condition is treated by making healthy lifestyle changes, such as eating healthy foods, exercising more, and reducing your alcohol intake. Your health care provider may prescribe medicine if lifestyle changes are not enough to get your blood pressure under control, and if:  · Your systolic blood pressure is above 130.  · Your diastolic blood pressure is above 80.  Your personal target blood pressure may vary depending on your medical conditions, your age, and other factors.  Follow these instructions at home:  Eating and drinking    · Eat a diet that is high in fiber and potassium, and low in sodium, added sugar, and fat. An example eating plan is called the DASH (Dietary Approaches to Stop Hypertension) diet. To eat this way:  ? Eat plenty of fresh fruits and vegetables. Try to fill one half of your plate at each meal with fruits and vegetables.  ? Eat whole grains, such as whole-wheat pasta, brown rice, or whole-grain bread. Fill about one fourth of your plate with whole grains.  ? Eat or drink low-fat dairy products, such as skim milk or low-fat yogurt.  ? Avoid fatty cuts of meat, processed or cured meats, and poultry with skin. Fill about one fourth of your plate with lean proteins, such as fish, chicken without skin, beans, eggs, or tofu.  ? Avoid pre-made and processed foods. These tend to be higher in sodium, added sugar, and fat.  · Reduce your daily sodium intake. Most people with hypertension should eat less than 1,500 mg of sodium a day.  · Do not drink alcohol if:  ? Your health care provider tells you not to  drink.  ? You are pregnant, may be pregnant, or are planning to become pregnant.  · If you drink alcohol:  ? Limit how much you use to:  § 0-1 drink a day for women.  § 0-2 drinks a day for men.  ? Be aware of how much alcohol is in your drink. In the [place] one drink equals one 12 oz bottle of beer (355 mL), one 5 oz glass of wine (148 mL), or one 1½ oz glass of hard liquor (44 mL).    Lifestyle    · Work with your health care provider to maintain a healthy body weight or to lose weight. Ask what an ideal weight is for you.  · Get at least 30 minutes of exercise most days of the week. Activities may include walking, swimming, or biking.  · Include exercise to strengthen your muscles (resistance exercise), such as Pilates or lifting weights, as part of your weekly exercise routine. Try to do these types of exercises for 30 minutes at least 3 days a week.  · Do not use any products that contain nicotine or tobacco, such as cigarettes, e-cigarettes, and chewing tobacco. If you need help quitting, ask your health care provider.  · Monitor your blood pressure at home as told by your health care provider.  · Keep all follow-up visits as told by your health care provider. This is important.    Medicines  · Take over-the-counter and prescription medicines only as told by your health care provider. Follow directions carefully. Blood pressure medicines must be taken as prescribed.  · Do not skip doses of blood pressure medicine. Doing this puts you at risk for problems and can make the medicine less effective.  · Ask your health care provider about side effects or reactions to medicines that you should watch for.  Contact a health care provider if you:  · Think you are having a reaction to a medicine you are taking.  · Have headaches that keep coming back (recurring).  · Feel dizzy.  · Have swelling in your ankles.  · Have trouble with your vision.  Get help right away if you:  · Develop a severe headache or  confusion.  · Have unusual weakness or numbness.  · Feel faint.  · Have severe pain in your chest or abdomen.  · Vomit repeatedly.  · Have trouble breathing.  Summary  · Hypertension is when the force of blood pumping through your arteries is too strong. If this condition is not controlled, it may put you at risk for serious complications.  · Your personal target blood pressure may vary depending on your medical conditions, your age, and other factors. For most people, a normal blood pressure is less than 120/80.  · Hypertension is treated with lifestyle changes, medicines, or a combination of both. Lifestyle changes include losing weight, eating a healthy, low-sodium diet, exercising more, and limiting alcohol.  This information is not intended to replace advice given to you by your health care provider. Make sure you discuss any questions you have with your health care provider.  Document Revised: 08/28/2019 Document Reviewed: 08/28/2019  [employer] Patient Education © 2021 [employer]  Diabetes Mellitus and Nutrition, Adult  When you have diabetes, or diabetes mellitus, it is very important to have healthy eating habits because your blood sugar (glucose) levels are greatly affected by what you eat and drink. Eating healthy foods in the right amounts, at about the same times every day, can help you:  · Control your blood glucose.  · Lower your risk of heart disease.  · Improve your blood pressure.  · Reach or maintain a healthy weight.  What can affect my meal plan?  Every person with diabetes is different, and each person has different needs for a meal plan. Your health care provider may recommend that you work with a dietitian to make a meal plan that is best for you. Your meal plan may vary depending on factors such as:  · The calories you need.  · The medicines you take.  · Your weight.  · Your blood glucose, blood pressure, and cholesterol levels.  · Your activity level.  · Other health conditions you have,  "such as heart or kidney disease.  How do carbohydrates affect me?  Carbohydrates, also called carbs, affect your blood glucose level more than any other type of food. Eating carbs naturally raises the amount of glucose in your blood. Carb counting is a method for keeping track of how many carbs you eat. Counting carbs is important to keep your blood glucose at a healthy level, especially if you use insulin or take certain oral diabetes medicines.  It is important to know how many carbs you can safely have in each meal. This is different for every person. Your dietitian can help you calculate how many carbs you should have at each meal and for each snack.  How does alcohol affect me?  Alcohol can cause a sudden decrease in blood glucose (hypoglycemia), especially if you use insulin or take certain oral diabetes medicines. Hypoglycemia can be a life-threatening condition. Symptoms of hypoglycemia, such as sleepiness, dizziness, and confusion, are similar to symptoms of having too much alcohol.  · Do not drink alcohol if:  ? Your health care provider tells you not to drink.  ? You are pregnant, may be pregnant, or are planning to become pregnant.  · If you drink alcohol:  ? Do not drink on an empty stomach.  ? Limit how much you use to:  § 0-1 drink a day for women.  § 0-2 drinks a day for men.  ? Be aware of how much alcohol is in your drink. In the [place] one drink equals one 12 oz bottle of beer (355 mL), one 5 oz glass of wine (148 mL), or one 1½ oz glass of hard liquor (44 mL).  ? Keep yourself hydrated with water, diet soda, or unsweetened iced tea.  § Keep in mind that regular soda, juice, and other mixers may contain a lot of sugar and must be counted as carbs.  What are tips for following this plan?    Reading food labels  · Start by checking the serving size on the \"Nutrition Facts\" label of packaged foods and drinks. The amount of calories, carbs, fats, and other nutrients listed on the label is based on one " "serving of the item. Many items contain more than one serving per package.  · Check the total grams (g) of carbs in one serving. You can calculate the number of servings of carbs in one serving by dividing the total carbs by 15. For example, if a food has 30 g of total carbs per serving, it would be equal to 2 servings of carbs.  · Check the number of grams (g) of saturated fats and trans fats in one serving. Choose foods that have a low amount or none of these fats.  · Check the number of milligrams (mg) of salt (sodium) in one serving. Most people should limit total sodium intake to less than 2,300 mg per day.  · Always check the nutrition information of foods labeled as \"low-fat\" or \"nonfat.\" These foods may be higher in added sugar or refined carbs and should be avoided.  · Talk to your dietitian to identify your daily goals for nutrients listed on the label.  Shopping  · Avoid buying canned, pre-made, or processed foods. These foods tend to be high in fat, sodium, and added sugar.  · Shop around the outside edge of the grocery store. This is where you will most often find fresh fruits and vegetables, bulk grains, fresh meats, and fresh dairy.  Cooking  · Use low-heat cooking methods, such as baking, instead of high-heat cooking methods like deep frying.  · Cook using healthy oils, such as olive, canola, or sunflower oil.  · Avoid cooking with butter, cream, or high-fat meats.  Meal planning  · Eat meals and snacks regularly, preferably at the same times every day. Avoid going long periods of time without eating.  · Eat foods that are high in fiber, such as fresh fruits, vegetables, beans, and whole grains. Talk with your dietitian about how many servings of carbs you can eat at each meal.  · Eat 4-6 oz (112-168 g) of lean protein each day, such as lean meat, chicken, fish, eggs, or tofu. One ounce (oz) of lean protein is equal to:  ? 1 oz (28 g) of meat, chicken, or fish.  ? 1 egg.  ? ¼ cup (62 g) of " tofu.  · Eat some foods each day that contain healthy fats, such as avocado, nuts, seeds, and fish.  What foods should I eat?  Fruits  Berries. Apples. Oranges. Peaches. Apricots. Plums. Grapes. [place]. Papaya. Pomegranate. Kiwi. Cherries.  Vegetables  Lettuce. Spinach. Leafy greens, including kale, chard, mohamud greens, and mustard greens. Beets. Cauliflower. Cabbage. Broccoli. Carrots. Green beans. Tomatoes. Peppers. Onions. Cucumbers. [place] sprouts.  Grains  Whole grains, such as whole-wheat or whole-grain bread, crackers, tortillas, cereal, and pasta. Unsweetened oatmeal. Quinoa. Brown or wild rice.  Meats and other proteins  Seafood. Poultry without skin. Lean cuts of poultry and beef. Tofu. Nuts. Seeds.  Dairy  Low-fat or fat-free dairy products such as milk, yogurt, and cheese.  The items listed above may not be a complete list of foods and beverages you can eat. Contact a dietitian for more information.  What foods should I avoid?  Fruits  Fruits canned with syrup.  Vegetables  Canned vegetables. Frozen vegetables with butter or cream sauce.  Grains  Refined white flour and flour products such as bread, pasta, snack foods, and cereals. Avoid all processed foods.  Meats and other proteins  Fatty cuts of meat. Poultry with skin. Breaded or fried meats. Processed meat. Avoid saturated fats.  Dairy  Full-fat yogurt, cheese, or milk.  Beverages  Sweetened drinks, such as soda or iced tea.  The items listed above may not be a complete list of foods and beverages you should avoid. Contact a dietitian for more information.  Questions to ask a health care provider  · Do I need to meet with a diabetes educator?  · Do I need to meet with a dietitian?  · What number can I call if I have questions?  · When are the best times to check my blood glucose?  Where to find more information:  · American Diabetes Association: diabetes.org  · Academy of Nutrition and Dietetics: www.eatright.org  · [place] of Diabetes  and Digestive and Kidney Diseases: www.niddk.nih.gov  · Association of Diabetes Care and Education Specialists: www.diabeteseducator.org  Summary  · It is important to have healthy eating habits because your blood sugar (glucose) levels are greatly affected by what you eat and drink.  · A healthy meal plan will help you control your blood glucose and maintain a healthy lifestyle.  · Your health care provider may recommend that you work with a dietitian to make a meal plan that is best for you.  · Keep in mind that carbohydrates (carbs) and alcohol have immediate effects on your blood glucose levels. It is important to count carbs and to use alcohol carefully.  This information is not intended to replace advice given to you by your health care provider. Make sure you discuss any questions you have with your health care provider.  Document Revised: 11/24/2020 Document Reviewed: 11/24/2020  [employer] Patient Education © 2021 [employer]  Hemorrhagic Stroke    A hemorrhagic stroke is the sudden death of brain tissue that occurs when a blood vessel in the brain leaks or bursts (ruptures), causing bleeding in or around the brain(hemorrhage). When this happens, areas of the brain do not get enough oxygen, and blood builds up and presses on areas of the brain. Lack of oxygen and pressure from hemorrhaging can lead to brain damage and death.  There are two major types of hemorrhagic stroke:  · Intracerebral hemorrhage. This happens if bleeding occurs within the brain tissue.  · Subarachnoid hemorrhage. This happens if bleeding occurs in the area between the brain and the membrane that covers the brain (subarachnoid space).  Hemorrhagic stroke is a medical emergency. It can cause temporary or permanent brain damage and loss of brain function.  What are the causes?  This condition is caused by a blood vessel leaking or rupturing, which may be the result of:  · Part of a weakened blood vessel wall bulging or ballooning out  (cerebral aneurysm).  · A hardened, thin blood vessel cracking open and allowing blood to leak out. Blood vessels may become hardened and thin due to plaque buildup.  · Tangled blood vessels in the brain (brain arteriovenous malformation).  · Protein buildup in artery walls in the brain (amyloid angiopathy).  · Inflamed blood vessels (vasculitis).  · A tumor in the brain.  · High blood pressure (hypertension).  What increases the risk?  The following factors may make you more likely to develop this condition:  · Hypertension.  · Having abnormal blood vessels present since birth (congenital abnormality).  · Bleeding disorders, such as hemophilia, sickle cell disease, or liver disease.  · The blood becoming too thin while taking blood thinners (anticoagulants).  · Aging.  · Moderate or heavy alcohol use.  · Using drugs, such as cocaine or methamphetamines.  What are the signs or symptoms?  Symptoms of this condition usually appear suddenly, and may include:  · Weakness or numbness of the face, arm, or leg, especially on one side of the body.  · Confusion.  · Difficulty speaking (aphasia) or understanding speech.  · Difficulty seeing out of one or both eyes.  · Difficulty walking or moving the arms or legs.  · Dizziness.  · Loss of balance or coordination.  · Seizures.  · A severe headache with no known cause. This headache may feel like the worst headache a person has ever experienced.  How is this diagnosed?  This condition may be diagnosed based on:  · Your symptoms.  · Your medical history.  · A physical exam.  · Tests, including:  ? Blood tests.  ? CT scan.  ? MRI.  ? CT angiography (CTA) or magnetic resonance angiography (MRA).  · Catheter angiogram. In this procedure, dye is injected through a long, thin tube (catheter) into one of your arteries. Then, X-rays are taken. The X-rays will show whether there is a blockage or a problem in a blood vessel.  How is this treated?  This condition is a medical emergency  that must be treated in a hospital immediately. The goals of treatment are to stop bleeding, reduce pressure on the brain, relieve symptoms, and prevent complications. Treatment for this condition may include:  · Medicines that:  ? Lower blood pressure (antihypertensives).  ? Relieve pain (analgesics).  ? Relieve nausea or vomiting.  ? Stop or prevent seizures (anticonvulsants).  ? Relieve fever.  ? Prevent blood vessels in the brain from spasming in response to bleeding.  ? Control bleeding in the brain.  · Assisted breathing (ventilation). This involves using a machine to help you breathe (ventilator).  · Receiving donated blood products through an IV (transfusion). You will receive cells that help your blood clot.  · Placement of a tube (shunt) in the brain to relieve pressure.  · Physical, speech, or occupational therapy.  · Surgery to stop bleeding, remove a blood clot or tumor, or reduce pressure.  Treatment depends on the cause, severity, and duration of symptoms. Medicines and changes to your diet may be used to help treat and manage risk factors for stroke, such as diabetes and high blood pressure. Recovery from hemorrhagic stroke varies widely. Talk with your health care provider about what to expect during your recovery.  Follow these instructions at home:  Activity  · Use a walker or a cane as told by your health care provider.  · Return to your normal activities as told by your health care provider. Ask your health care provider what activities are safe for you.  · Rest. Rest helps the brain to heal. Make sure you:  ? Get plenty of sleep. Avoid staying up late at night.  ? Keep a consistent sleep schedule. Try to go to sleep and wake up at about the same time every day.  ? Avoid activities that cause physical or mental stress.  Lifestyle  · Do not drink alcohol if:  ? Your health care provider tells you not to drink.  ? You are pregnant, may be pregnant, or are planning to become pregnant.  · If you  "drink alcohol:  ? Limit how much you use to:  § 0-1 drink a day for women.  § 0-2 drinks a day for men.  ? Be aware of how much alcohol is in your drink. In the [place] one drink equals one 12 oz bottle of beer (355 mL), one 5 oz glass of wine (148 mL), or one 1½ oz glass of hard liquor (44 mL).  General instructions  · Do not drive or use heavy machinery until your health care provider approves.  · Take over-the-counter and prescription medicines only as told by your health care provider.  · Keep all follow-up visits as told by your health care provider, including visits with therapists. This is important.  How is this prevented?  Your risk of stroke can be decreased by working with your health care provider to treat:  · High blood pressure.  · High cholesterol.  · Diabetes.  · Heart disease.  · Obesity.  Your risk of stroke can also be decreased by quitting smoking, limiting alcohol, and staying physically active. If you take the blood thinner warfarin, have your bloodwork monitored frequently by your health care provider.  Contact a health care provider if:  You develop any of the following symptoms:  · Headaches that keep coming back (chronic headaches).  · Nausea.  · Vision problems.  · Increased sensitivity to noise or light.  · Depression or mood swings.  · Anxiety or irritability.  · Memory problems.  · Difficulty concentrating or paying attention.  · Sleep problems.  · Feeling tired all of the time.  Get help right away if you:  · Have a partial or total loss of consciousness.  · Are taking blood thinners and you fall or you experience minor injury to the head.  · Have a bleeding disorder and you fall or you experience minor trauma to the head.  · Have any symptoms of a stroke. \"BE FAST\" is an easy way to remember the main warning signs of a stroke:  ? B - Balance. Signs are dizziness, sudden trouble walking, or loss of balance.  ? E - Eyes. Signs are trouble seeing or a sudden change in vision.  ? F - Face. " Signs are sudden weakness or numbness of the face, or the face or eyelid drooping on one side.  ? A - Arms. Signs are weakness or numbness in an arm. This happens suddenly and usually on one side of the body.  ? S - Speech. Signs are sudden trouble speaking, slurred speech, or trouble understanding what people say.  ? T - Time. Time to call emergency services. Write down what time symptoms started.  · Have other signs of a stroke, such as:  ? A sudden, severe headache with no known cause.  ? Nausea or vomiting.  ? Seizure.  These symptoms may represent a serious problem that is an emergency. Do not wait to see if the symptoms will go away. Get medical help right away. Call your local emergency services (911 in the [place] Do not drive yourself to the hospital.  Summary  · Hemorrhagic stroke is a medical emergency.  · Hemorrhagic stroke is caused by bleeding in or around the brain.  · Know the signs and symptoms of stroke.  · Know your stroke risk factors. Work with your health care provider to decrease your risk of stroke.  This information is not intended to replace advice given to you by your health care provider. Make sure you discuss any questions you have with your health care provider.  Document Revised: 01/23/2020 Document Reviewed: 01/24/2020  [employer] Patient Education © 2021 [employer]     3plus-4plus/5 grossly graded

## 2022-12-21 NOTE — ED PROVIDER NOTE - CONSTITUTIONAL, MLM
normal... Well appearing, well nourished, awake, alert, oriented to person, place, time/situation and in no apparent distress. (4) no impairment